# Patient Record
Sex: FEMALE | Race: WHITE | Employment: OTHER | URBAN - NONMETROPOLITAN AREA
[De-identification: names, ages, dates, MRNs, and addresses within clinical notes are randomized per-mention and may not be internally consistent; named-entity substitution may affect disease eponyms.]

---

## 2017-07-24 ENCOUNTER — HOSPITAL ENCOUNTER (OUTPATIENT)
Dept: LAB | Age: 67
Discharge: HOME | End: 2017-07-24
Attending: INTERNAL MEDICINE | Admitting: INTERNAL MEDICINE

## 2017-07-24 LAB
ALBUMIN SERPL-MCNC: 4.3 GM/DL (ref 3.4–5)
ALP BLD-CCNC: 63 IU/L (ref 40–129)
ALT SERPL-CCNC: 10 U/L (ref 10–40)
ANION GAP SERPL CALCULATED.3IONS-SCNC: 10 MMOL/L (ref 4–16)
AST SERPL-CCNC: 13 IU/L (ref 15–37)
BASOPHILS ABSOLUTE: 0.1 K/CU MM
BASOPHILS RELATIVE PERCENT: 0.7 % (ref 0–1)
BILIRUB SERPL-MCNC: 0.5 MG/DL (ref 0–1)
BUN BLDV-MCNC: 8 MG/DL (ref 6–23)
CALCIUM SERPL-MCNC: 9.7 MG/DL (ref 8.3–10.6)
CHLORIDE BLD-SCNC: 98 MMOL/L (ref 99–110)
CO2: 31 MMOL/L (ref 21–32)
CREAT SERPL-MCNC: 0.9 MG/DL (ref 0.6–1.1)
DIFFERENTIAL TYPE: ABNORMAL
EOSINOPHILS ABSOLUTE: 0.1 K/CU MM
EOSINOPHILS RELATIVE PERCENT: 0.8 % (ref 0–3)
GFR AFRICAN AMERICAN: >60 ML/MIN/1.73M2
GFR NON-AFRICAN AMERICAN: >60 ML/MIN/1.73M2
GLUCOSE BLD-MCNC: 82 MG/DL (ref 70–140)
HCT VFR BLD CALC: 46.6 % (ref 37–47)
HEMOGLOBIN: 14.9 GM/DL (ref 12.5–16)
IMMATURE NEUTROPHIL %: 0.4 % (ref 0–0.43)
LYMPHOCYTES ABSOLUTE: 1.8 K/CU MM
LYMPHOCYTES RELATIVE PERCENT: 24.3 % (ref 24–44)
MCH RBC QN AUTO: 29.1 PG (ref 27–31)
MCHC RBC AUTO-ENTMCNC: 32 % (ref 32–36)
MCV RBC AUTO: 91 FL (ref 78–100)
MONOCYTES ABSOLUTE: 0.5 K/CU MM
MONOCYTES RELATIVE PERCENT: 7.1 % (ref 0–4)
PDW BLD-RTO: 13 % (ref 11.7–14.9)
PLATELET # BLD: 167 K/CU MM (ref 140–440)
PMV BLD AUTO: 12.1 FL (ref 7.5–11.1)
POTASSIUM SERPL-SCNC: 4.1 MMOL/L (ref 3.5–5.1)
RBC # BLD: 5.12 M/CU MM (ref 4.2–5.4)
SEGMENTED NEUTROPHILS ABSOLUTE COUNT: 5 K/CU MM
SEGMENTED NEUTROPHILS RELATIVE PERCENT: 66.7 % (ref 36–66)
SODIUM BLD-SCNC: 139 MMOL/L (ref 135–145)
TOTAL IMMATURE NEUTOROPHIL: 0.03 K/CU MM
TOTAL PROTEIN: 7 GM/DL (ref 6.4–8.2)
WBC # BLD: 7.5 K/CU MM (ref 4–10.5)

## 2017-07-25 LAB
CLOSTRIDIUM DIFFICILE, PCR: NORMAL
ERYTHROCYTE SEDIMENTATION RATE: 5 MM/HR (ref 0–30)
HIGH SENSITIVE C-REACTIVE PROTEIN: 1.7 MG/L

## 2019-02-12 ENCOUNTER — HOSPITAL ENCOUNTER (OUTPATIENT)
Dept: ULTRASOUND IMAGING | Age: 69
Discharge: HOME | End: 2019-02-12
Payer: MEDICARE

## 2019-02-12 ENCOUNTER — HOSPITAL ENCOUNTER (OUTPATIENT)
Dept: WOMENS IMAGING | Age: 69
Discharge: HOME | End: 2019-02-12
Payer: MEDICARE

## 2019-02-12 DIAGNOSIS — N64.4 BREAST PAIN, LEFT: ICD-10-CM

## 2019-02-12 DIAGNOSIS — R92.8 ABNORMAL MAMMOGRAM: ICD-10-CM

## 2019-02-12 DIAGNOSIS — N63.24 UNSPECIFIED LUMP IN THE LEFT BREAST, LOWER INNER QUADRANT: ICD-10-CM

## 2019-02-12 PROCEDURE — 76642 ULTRASOUND BREAST LIMITED: CPT

## 2019-02-12 PROCEDURE — 77066 DX MAMMO INCL CAD BI: CPT

## 2019-05-07 ENCOUNTER — HOSPITAL ENCOUNTER (OUTPATIENT)
Dept: GENERAL RADIOLOGY | Age: 69
Discharge: HOME | End: 2019-05-07
Payer: MEDICARE

## 2019-05-07 DIAGNOSIS — R10.9 STOMACH ACHE: ICD-10-CM

## 2019-05-07 PROCEDURE — 74250 X-RAY XM SM INT 1CNTRST STD: CPT

## 2019-11-04 ENCOUNTER — HOSPITAL ENCOUNTER (OUTPATIENT)
Dept: MRI IMAGING | Age: 69
Discharge: HOME | End: 2019-11-04
Payer: MEDICARE

## 2019-11-04 DIAGNOSIS — M54.50 LOW BACK PAIN, UNSPECIFIED BACK PAIN LATERALITY, UNSPECIFIED CHRONICITY, UNSPECIFIED WHETHER SCIATICA PRESENT: ICD-10-CM

## 2019-11-04 PROCEDURE — 72148 MRI LUMBAR SPINE W/O DYE: CPT

## 2023-05-10 ENCOUNTER — HOSPITAL ENCOUNTER (OUTPATIENT)
Dept: MAMMOGRAPHY | Age: 73
Discharge: HOME | End: 2023-05-10
Payer: MEDICARE

## 2023-05-10 DIAGNOSIS — Z12.31 SCREENING MAMMOGRAM, ENCOUNTER FOR: ICD-10-CM

## 2023-05-10 PROCEDURE — 77063 BREAST TOMOSYNTHESIS BI: CPT

## 2024-02-02 ENCOUNTER — HOSPITAL ENCOUNTER (OUTPATIENT)
Dept: CT IMAGING | Age: 74
Discharge: HOME | End: 2024-02-02
Attending: FAMILY MEDICINE
Payer: MEDICARE

## 2024-02-02 DIAGNOSIS — R10.12 LUQ ABDOMINAL PAIN: ICD-10-CM

## 2024-02-02 PROCEDURE — 74177 CT ABD & PELVIS W/CONTRAST: CPT

## 2024-02-02 PROCEDURE — 6360000004 HC RX CONTRAST MEDICATION: Performed by: FAMILY MEDICINE

## 2024-02-02 RX ADMIN — IOPAMIDOL 100 ML: 755 INJECTION, SOLUTION INTRAVENOUS at 15:35

## 2024-02-02 RX ADMIN — IOPAMIDOL 20 ML: 755 INJECTION, SOLUTION INTRAVENOUS at 14:10

## 2024-06-05 ENCOUNTER — APPOINTMENT (OUTPATIENT)
Dept: GENERAL RADIOLOGY | Age: 74
End: 2024-06-05
Attending: STUDENT IN AN ORGANIZED HEALTH CARE EDUCATION/TRAINING PROGRAM
Payer: MEDICARE

## 2024-06-05 ENCOUNTER — APPOINTMENT (OUTPATIENT)
Dept: GENERAL RADIOLOGY | Age: 74
End: 2024-06-05
Payer: MEDICARE

## 2024-06-05 ENCOUNTER — HOSPITAL ENCOUNTER (EMERGENCY)
Age: 74
Discharge: HOME | End: 2024-06-05
Attending: STUDENT IN AN ORGANIZED HEALTH CARE EDUCATION/TRAINING PROGRAM
Payer: MEDICARE

## 2024-06-05 VITALS
DIASTOLIC BLOOD PRESSURE: 72 MMHG | OXYGEN SATURATION: 95 % | RESPIRATION RATE: 18 BRPM | HEIGHT: 64 IN | BODY MASS INDEX: 46.1 KG/M2 | WEIGHT: 270 LBS | TEMPERATURE: 98.3 F | HEART RATE: 82 BPM | SYSTOLIC BLOOD PRESSURE: 118 MMHG

## 2024-06-05 DIAGNOSIS — M79.605 PAIN OF LEFT LOWER EXTREMITY: ICD-10-CM

## 2024-06-05 DIAGNOSIS — S82.832A CLOSED FRACTURE OF PROXIMAL END OF LEFT FIBULA, UNSPECIFIED FRACTURE MORPHOLOGY, INITIAL ENCOUNTER: ICD-10-CM
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
## 2024-06-05 PROCEDURE — 6360000002 HC RX W HCPCS: Performed by: EMERGENCY MEDICINE

## 2024-06-05 PROCEDURE — 96372 THER/PROPH/DIAG INJ SC/IM: CPT

## 2024-06-05 PROCEDURE — 73590 X-RAY EXAM OF LOWER LEG: CPT

## 2024-06-05 PROCEDURE — 29125 APPL SHORT ARM SPLINT STATIC: CPT

## 2024-06-05 PROCEDURE — 29515 APPLICATION SHORT LEG SPLINT: CPT

## 2024-06-05 PROCEDURE — 6360000002 HC RX W HCPCS: Performed by: STUDENT IN AN ORGANIZED HEALTH CARE EDUCATION/TRAINING PROGRAM

## 2024-06-05 PROCEDURE — 72170 X-RAY EXAM OF PELVIS: CPT

## 2024-06-05 PROCEDURE — 99284 EMERGENCY DEPT VISIT MOD MDM: CPT

## 2024-06-05 PROCEDURE — 96374 THER/PROPH/DIAG INJ IV PUSH: CPT

## 2024-06-05 RX ORDER — PANTOPRAZOLE SODIUM 40 MG/1
40 TABLET, DELAYED RELEASE ORAL DAILY
COMMUNITY
Start: 2024-05-16

## 2024-06-05 RX ORDER — OXYCODONE AND ACETAMINOPHEN 7.5; 325 MG/1; MG/1
1 TABLET ORAL EVERY 8 HOURS PRN
COMMUNITY
Start: 2024-06-05 | End: 2024-07-05

## 2024-06-05 RX ORDER — GABAPENTIN 100 MG/1
200 CAPSULE ORAL 3 TIMES DAILY
COMMUNITY
Start: 2024-06-11 | End: 2024-08-10

## 2024-06-05 RX ORDER — KETOROLAC TROMETHAMINE 30 MG/ML
30 INJECTION, SOLUTION INTRAMUSCULAR; INTRAVENOUS ONCE
Status: COMPLETED | OUTPATIENT
Start: 2024-06-05 | End: 2024-06-05

## 2024-06-05 RX ADMIN — KETOROLAC TROMETHAMINE 30 MG: 30 INJECTION INTRAMUSCULAR; INTRAVENOUS at 21:08

## 2024-06-05 RX ADMIN — HYDROMORPHONE HYDROCHLORIDE 0.5 MG: 1 INJECTION, SOLUTION INTRAMUSCULAR; INTRAVENOUS; SUBCUTANEOUS at 16:04

## 2024-06-05 RX ADMIN — HYDROMORPHONE HYDROCHLORIDE 0.5 MG: 1 INJECTION, SOLUTION INTRAMUSCULAR; INTRAVENOUS; SUBCUTANEOUS at 21:08

## 2024-06-05 ASSESSMENT — PAIN - FUNCTIONAL ASSESSMENT
PAIN_FUNCTIONAL_ASSESSMENT: PREVENTS OR INTERFERES WITH ALL ACTIVE AND SOME PASSIVE ACTIVITIES
PAIN_FUNCTIONAL_ASSESSMENT: 0-10

## 2024-06-05 ASSESSMENT — PAIN DESCRIPTION - ORIENTATION
ORIENTATION: LEFT

## 2024-06-05 ASSESSMENT — PAIN DESCRIPTION - LOCATION
LOCATION: LEG

## 2024-06-05 ASSESSMENT — PAIN SCALES - GENERAL
PAINLEVEL_OUTOF10: 2
PAINLEVEL_OUTOF10: 10
PAINLEVEL_OUTOF10: 9

## 2024-06-05 ASSESSMENT — PAIN DESCRIPTION - DESCRIPTORS
DESCRIPTORS: ACHING;DISCOMFORT
DESCRIPTORS: ACHING;DISCOMFORT
DESCRIPTORS: ACHING

## 2024-06-05 ASSESSMENT — LIFESTYLE VARIABLES
HOW MANY STANDARD DRINKS CONTAINING ALCOHOL DO YOU HAVE ON A TYPICAL DAY: PATIENT DOES NOT DRINK
HOW OFTEN DO YOU HAVE A DRINK CONTAINING ALCOHOL: NEVER

## 2024-06-05 NOTE — DISCHARGE INSTRUCTIONS
-Take Tylenol (1000 mg, every 6-8 hours) and/or ibuprofen (600 mg, every 6-8 hours) as needed for pain  -Follow-up with orthopedic surgery in 1 week  -, To emergency department if you develop severe pain, if they are unable to take care of you at home, if the splint is very tight, or if you are concerned

## 2024-06-05 NOTE — ED PROVIDER NOTES
Emergency Department Encounter    Patient: Darrell Sutton  MRN: [id number]  : 1950  Date of Evaluation: 2024  ED Provider:  Audrey Green MD    Briefly, Darrell Sutton is a 74 y.o. female presented to the emergency department with left lower leg pain.  She did fall several days ago.  She has has mobility issues at baseline.  She is seen by previous physician.  Please see that note for full HPI.    I have reviewed and interpreted all of the currently available lab results from this visit (if applicable)  No results found for this visit on 24.   Radiographs (if obtained):    [] Radiologist's Report Reviewed:  XR PELVIS (1-2 VIEWS)   Final Result   1. As above.      Electronically signed by Nessa Edmondson      XR TIBIA FIBULA LEFT (2 VIEWS)   Final Result      Mildly comminuted acute appearing fracture involving the proximal fibula      Electronically signed by Eric Perez          MDM:    71-year-old female presents with left lower leg pain.  She is seen by previous physician.  She did fall at home several days ago.  She has also mobility issues at baseline.  She endorses a history of lumbar stenosis.    Her x-ray of fibula does show a proximal fibular fracture.  This is discussed with patient.  She is placed in a splint.  She is able with help at home to take care of her needs at home.  She will follow-up outpatient.  She is given referral to orthopedics.  She is in pain management.  She was show asymptomatic the emergency department.  She will resume treatment with her home pain medications.    She is discharged, in stable condition, with strict return precautions    Clinical Impression:  1. Fall, initial encounter    2. Pain of left lower extremity    3. Closed fracture of proximal end of left fibula, unspecified fracture morphology, initial encounter      Disposition referral (if applicable):  Max Koo MD   [address]  [facility] [address]  [phone]    Schedule an 
[id number]  [phone]    Schedule an appointment as soon as possible for a visit   As needed    Jose Carlos Hackett MD  [address]  [facility] [address]  [phone]    Schedule an appointment as soon as possible for a visit       Disposition medications (if applicable):  New Prescriptions    No medications on file     ED Provider Disposition Time  DISPOSITION  - pending      Comment: Please note this report has been produced using speech recognition software and may contain errors related to that system including errors in grammar, punctuation, and spelling, as well as words and phrases that may be inappropriate.  Efforts were made to edit the dictations.        David Brown MD  06/05/24 8233

## 2024-06-11 ENCOUNTER — OFFICE VISIT (OUTPATIENT)
Dept: ORTHOPEDIC SURGERY | Age: 74
End: 2024-06-11
Payer: MEDICARE

## 2024-06-11 VITALS
WEIGHT: 270 LBS | RESPIRATION RATE: 14 BRPM | OXYGEN SATURATION: 95 % | HEIGHT: 64 IN | BODY MASS INDEX: 46.1 KG/M2 | HEART RATE: 85 BPM

## 2024-06-11 DIAGNOSIS — S82.832A OTHER CLOSED FRACTURE OF PROXIMAL END OF LEFT FIBULA, INITIAL ENCOUNTER: Primary | ICD-10-CM

## 2024-06-11 PROCEDURE — 1090F PRES/ABSN URINE INCON ASSESS: CPT

## 2024-06-11 PROCEDURE — 3017F COLORECTAL CA SCREEN DOC REV: CPT

## 2024-06-11 PROCEDURE — 99203 OFFICE O/P NEW LOW 30 MIN: CPT

## 2024-06-11 PROCEDURE — G8417 CALC BMI ABV UP PARAM F/U: HCPCS

## 2024-06-11 PROCEDURE — 4004F PT TOBACCO SCREEN RCVD TLK: CPT

## 2024-06-11 PROCEDURE — G8400 PT W/DXA NO RESULTS DOC: HCPCS

## 2024-06-11 PROCEDURE — G8427 DOCREV CUR MEDS BY ELIG CLIN: HCPCS

## 2024-06-11 PROCEDURE — 1123F ACP DISCUSS/DSCN MKR DOCD: CPT

## 2024-06-11 NOTE — PATIENT INSTRUCTIONS
Ace bandage used for the left knee  Home PT ordered.  Weight bear to tranfers  Only allowed to walk 5-10 feet max  Ice and elevate as needed.  Tylenol or Motrin for pain.  Follow up in 4 weeks.    We are committed to providing you the best care possible.  If you receive a survey after visiting one of our offices, please take time to share your experience concerning your physician office visit.  These surveys are confidential and no health information about you is shared.  We are eager to improve for you and we are counting on your feedback to help make that happen.

## 2024-06-12 ASSESSMENT — ENCOUNTER SYMPTOMS
FACIAL SWELLING: 0
COUGH: 0
NAUSEA: 0
RHINORRHEA: 0
BACK PAIN: 0
SHORTNESS OF BREATH: 0

## 2024-06-12 NOTE — PROGRESS NOTES
6/11/2024   Chief Complaint   Patient presents with    Fracture     Clsoed proximal end of left fibula DOI 2 weeks ago         History of Present Illness:                             Darrell Sutton is a 74 y.o. female presenting to the office today for lateral leg pain at the left proximal shin after suffering a fall roughly 2 weeks ago.  Patient states she has a previous baseline of only walking very short distances for transfers and to get to the bathroom.  Patient states that she fell and went to the emergency room only to be diagnosed with a proximal fibular fracture.  She states she has pain and bruising on the outside of her shin.  She notes the pain has improved slightly but if the area is pressed or bumped it is very tender.          Medical History  Patient's medications, allergies, past medical, surgical, social and family histories were reviewed and updated as appropriate.    Past Medical History:   Diagnosis Date    Anxiety     Cancer (HCC)     ductal carcioma left    History of therapeutic radiation     Hx antineoplastic chemo     Hypertension     IBS (irritable bowel syndrome)     Injury of breast     Osteoarthritis      Past Surgical History:   Procedure Laterality Date    BREAST LUMPECTOMY      BREAST SURGERY  01/01/2009    left lumpectomy    CHOLECYSTECTOMY, LAPAROSCOPIC  12/05/2014    Robotic assisted laproscopic cholesystectomy    EYE SURGERY Right 1029/2013    Cataract Extraction    TONSILLECTOMY      TUBAL LIGATION       Family History   Problem Relation Age of Onset    Diabetes Mother     Heart Disease Mother         angioplasty    Alzheimer's Disease Mother     Coronary Art Dis Father     Coronary Art Dis Sister     Diabetes Sister      Social History     Socioeconomic History    Marital status:      Spouse name: Alon    Number of children: 2    Years of education: None    Highest education level: None   Occupational History    Occupation: retired   Tobacco Use    Smoking

## 2024-06-24 ENCOUNTER — TELEPHONE (OUTPATIENT)
Dept: ORTHOPEDIC SURGERY | Age: 74
End: 2024-06-24

## 2024-06-24 NOTE — TELEPHONE ENCOUNTER
PT is asking if there are any ROM restrictions for Darrell Sutton she has FINDINGS: Anatomic alignment. Mildly comminuted acute appearing fracture involving the proximal fibula. Mild degenerative change involving the visualized  left knee. IMPRESSION:   Mildly comminuted acute appearing fracture involving the proximal fibula   also she would like to know if she can take the ACE wrap off at night

## 2024-07-09 ENCOUNTER — OFFICE VISIT (OUTPATIENT)
Dept: ORTHOPEDIC SURGERY | Age: 74
End: 2024-07-09
Payer: MEDICARE

## 2024-07-09 VITALS — TEMPERATURE: 97.2 F | OXYGEN SATURATION: 97 % | HEART RATE: 78 BPM

## 2024-07-09 DIAGNOSIS — S82.832D OTHER CLOSED FRACTURE OF PROXIMAL END OF LEFT FIBULA WITH ROUTINE HEALING, SUBSEQUENT ENCOUNTER: Primary | ICD-10-CM

## 2024-07-09 PROCEDURE — 4004F PT TOBACCO SCREEN RCVD TLK: CPT

## 2024-07-09 PROCEDURE — 99213 OFFICE O/P EST LOW 20 MIN: CPT

## 2024-07-09 PROCEDURE — 1090F PRES/ABSN URINE INCON ASSESS: CPT

## 2024-07-09 PROCEDURE — G8417 CALC BMI ABV UP PARAM F/U: HCPCS

## 2024-07-09 PROCEDURE — 3017F COLORECTAL CA SCREEN DOC REV: CPT

## 2024-07-09 PROCEDURE — G8427 DOCREV CUR MEDS BY ELIG CLIN: HCPCS

## 2024-07-09 PROCEDURE — 1123F ACP DISCUSS/DSCN MKR DOCD: CPT

## 2024-07-09 PROCEDURE — G8400 PT W/DXA NO RESULTS DOC: HCPCS

## 2024-07-09 NOTE — PATIENT INSTRUCTIONS
Continue weight-bearing as tolerated.  Continue range of motion exercises as instructed.  Ice and elevate as needed.  Tylenol or Motrin for pain.  Follow up as needed.  If you are still having pains in 6 weeks please call office for another appointment.   Please schedule an appointment with your PCP for bilateral lower extremity edema    We are committed to providing you the best care possible.  If you receive a survey after visiting one of our offices, please take time to share your experience concerning your physician office visit.  These surveys are confidential and no health information about you is shared.  We are eager to improve for you and we are counting on your feedback to help make that happen.

## 2024-07-09 NOTE — PROGRESS NOTES
Patient seen in office today for:Closed fracture of proximal end of left fibula    Pt states no issues fx feels healed zero pain   She does have a nc she would like to speak to you about- left foot and ankle has been swelling a lot it has nothing to do with her fx many years this started would like your opinion  and a referral to someone else if you can .  Strength in her fibula is not coming back like before that is the only complaint she has with that left fibula fx        DOI:6/2/24      Patient reports 0/10 pain.  RICE and medication are effective to alleviate pain and reduce swelling.   Pain also alleviated by: no OTC medication  Pain worsened by: Patient reports painful ROM & weight bearing.     Patient does physical therapy twice a week and exercises also       Xrays performed in office today.

## 2024-07-23 ASSESSMENT — ENCOUNTER SYMPTOMS
RHINORRHEA: 0
SHORTNESS OF BREATH: 0
BACK PAIN: 0
FACIAL SWELLING: 0
COUGH: 0
NAUSEA: 0

## 2024-07-23 NOTE — PROGRESS NOTES
7/9/2024   Chief Complaint   Patient presents with    Knee Pain     Closed fracture of proximal end of left fibula          History of Present Illness:       Today's HPI:  Patient is a 74-year-old female returning to the office today for continued nonoperative management regarding a proximal fibula fracture.  Patient states she has had much improvement to her pain levels.  However, she does note some continued swelling when she is up on her feet more often.    Pt states no issues fx feels healed zero pain   She does have a nc she would like to speak to you about- left foot and ankle has been swelling a lot it has nothing to do with her fx many years this started would like your opinion  and a referral to someone else if you can .  Strength in her fibula is not coming back like before that is the only complaint she has with that left fibula fx          DOI:6/2/24        Patient reports 0/10 pain.  RICE and medication are effective to alleviate pain and reduce swelling.   Pain also alleviated by: no OTC medication  Pain worsened by: Patient reports painful ROM & weight bearing.      Patient does physical therapy twice a week and exercises also         Xrays performed in office today.   Previous HPI:                        Darrell Sutton is a 74 y.o. female presenting to the office today for lateral leg pain at the left proximal shin after suffering a fall roughly 2 weeks ago.  Patient states she has a previous baseline of only walking very short distances for transfers and to get to the bathroom.  Patient states that she fell and went to the emergency room only to be diagnosed with a proximal fibular fracture.  She states she has pain and bruising on the outside of her shin.  She notes the pain has improved slightly but if the area is pressed or bumped it is very tender.          Medical History  Patient's medications, allergies, past medical, surgical, social and family histories were reviewed and updated as

## 2024-08-05 ENCOUNTER — TELEPHONE (OUTPATIENT)
Dept: ORTHOPEDIC SURGERY | Age: 74
End: 2024-08-05

## 2024-08-05 NOTE — TELEPHONE ENCOUNTER
Juliann from [facility] ([phone]) called in stating that the patient is no longer wanting to do at home PT and she will continue doing her own home exercises.

## 2024-08-13 ENCOUNTER — OFFICE VISIT (OUTPATIENT)
Dept: ORTHOPEDIC SURGERY | Age: 74
End: 2024-08-13
Payer: MEDICARE

## 2024-08-13 VITALS — HEART RATE: 74 BPM | TEMPERATURE: 97.6 F | OXYGEN SATURATION: 96 %

## 2024-08-13 DIAGNOSIS — M19.012 ARTHRITIS OF LEFT SHOULDER REGION: Primary | ICD-10-CM

## 2024-08-13 PROCEDURE — 99213 OFFICE O/P EST LOW 20 MIN: CPT

## 2024-08-13 PROCEDURE — G8417 CALC BMI ABV UP PARAM F/U: HCPCS

## 2024-08-13 PROCEDURE — G8427 DOCREV CUR MEDS BY ELIG CLIN: HCPCS

## 2024-08-13 PROCEDURE — 1123F ACP DISCUSS/DSCN MKR DOCD: CPT

## 2024-08-13 PROCEDURE — G8400 PT W/DXA NO RESULTS DOC: HCPCS

## 2024-08-13 PROCEDURE — 4004F PT TOBACCO SCREEN RCVD TLK: CPT

## 2024-08-13 PROCEDURE — 1090F PRES/ABSN URINE INCON ASSESS: CPT

## 2024-08-13 PROCEDURE — 3017F COLORECTAL CA SCREEN DOC REV: CPT

## 2024-08-13 NOTE — PROGRESS NOTES
Patient seen in office today for: _left shoulder pain  Severe osteoarthritis       Date of last injection: no injections in should    Patient reports 10/10 pain.  RICE and medication are effective to alleviate pain and reduce swelling.   Pain also alleviated by: OTC meds as needed   Pain worsened by: Patient reports painful ROM & weight bearing.     Patient is interested in injection today    X rays performed in office today.

## 2024-08-15 ENCOUNTER — HOSPITAL ENCOUNTER (OUTPATIENT)
Dept: ULTRASOUND IMAGING | Age: 74
Discharge: HOME | End: 2024-08-15
Attending: FAMILY MEDICINE
Payer: MEDICARE

## 2024-08-15 DIAGNOSIS — M79.89 LEG SWELLING: ICD-10-CM

## 2024-08-15 PROCEDURE — 93971 EXTREMITY STUDY: CPT

## 2024-08-27 ASSESSMENT — ENCOUNTER SYMPTOMS
RHINORRHEA: 0
BACK PAIN: 0
FACIAL SWELLING: 0
NAUSEA: 0
SHORTNESS OF BREATH: 0
COUGH: 0

## 2024-08-27 NOTE — PROGRESS NOTES
8/13/2024   Chief Complaint   Patient presents with    Shoulder Pain     left shoulder pain             History of Present Illness:                             Darrell Sutton is a 74 y.o. female returning to the office today for left shoulder pain.  Patient states that she has not had injections in the left shoulder in the past but she has had increased shoulder pain over the last several months.  She states she has had treatment for other issues such as her knees in the past and cortisone was provided mixed results.  She is open to an injection in her shoulder today.  She states her pain is a 10 out of 10 and worse with overhead motions.  Patient denies any acute or traumatic injury that would easily explain her symptoms.    Patient seen in office today for: _left shoulder pain  Severe osteoarthritis         Date of last injection: no injections in should     Patient reports 10/10 pain.  RICE and medication are effective to alleviate pain and reduce swelling.   Pain also alleviated by: OTC meds as needed   Pain worsened by: Patient reports painful ROM & weight bearing.      Patient is interested in injection today     X rays performed in office today.        Medical History  Patient's medications, allergies, past medical, surgical, social and family histories were reviewed and updated as appropriate.      Review of Systems   Constitutional:  Negative for fever.   HENT:  Negative for facial swelling and rhinorrhea.    Respiratory:  Negative for cough and shortness of breath.    Cardiovascular:  Negative for chest pain.   Gastrointestinal:  Negative for nausea.   Musculoskeletal:  Positive for arthralgias. Negative for back pain, gait problem, joint swelling, myalgias, neck pain and neck stiffness.   Skin:  Negative for pallor and rash.   Neurological:  Negative for facial asymmetry and speech difficulty.   Psychiatric/Behavioral:  Negative for agitation and confusion.

## 2024-09-10 ENCOUNTER — PROCEDURE VISIT (OUTPATIENT)
Dept: ORTHOPEDIC SURGERY | Age: 74
End: 2024-09-10

## 2024-09-10 VITALS — OXYGEN SATURATION: 95 % | HEART RATE: 88 BPM | TEMPERATURE: 97.6 F

## 2024-09-10 DIAGNOSIS — M19.012 ARTHRITIS OF LEFT SHOULDER REGION: Primary | ICD-10-CM

## 2024-09-24 ENCOUNTER — OFFICE VISIT (OUTPATIENT)
Dept: ORTHOPEDIC SURGERY | Age: 74
End: 2024-09-24
Payer: MEDICARE

## 2024-09-24 VITALS
OXYGEN SATURATION: 97 % | WEIGHT: 270 LBS | HEIGHT: 64 IN | RESPIRATION RATE: 14 BRPM | HEART RATE: 75 BPM | BODY MASS INDEX: 46.1 KG/M2

## 2024-09-24 DIAGNOSIS — M19.011 ARTHRITIS OF RIGHT SHOULDER REGION: Primary | ICD-10-CM

## 2024-09-24 PROCEDURE — 1123F ACP DISCUSS/DSCN MKR DOCD: CPT

## 2024-09-24 PROCEDURE — G8400 PT W/DXA NO RESULTS DOC: HCPCS

## 2024-09-24 PROCEDURE — 1090F PRES/ABSN URINE INCON ASSESS: CPT

## 2024-09-24 PROCEDURE — 20610 DRAIN/INJ JOINT/BURSA W/O US: CPT

## 2024-09-24 PROCEDURE — 4004F PT TOBACCO SCREEN RCVD TLK: CPT

## 2024-09-24 PROCEDURE — 99213 OFFICE O/P EST LOW 20 MIN: CPT

## 2024-09-24 PROCEDURE — 3017F COLORECTAL CA SCREEN DOC REV: CPT

## 2024-09-24 PROCEDURE — G8417 CALC BMI ABV UP PARAM F/U: HCPCS

## 2024-09-24 PROCEDURE — G8427 DOCREV CUR MEDS BY ELIG CLIN: HCPCS

## 2024-09-24 RX ORDER — TRIAMCINOLONE ACETONIDE 40 MG/ML
80 INJECTION, SUSPENSION INTRA-ARTICULAR; INTRAMUSCULAR ONCE
Status: COMPLETED | OUTPATIENT
Start: 2024-09-24 | End: 2024-09-24

## 2024-09-24 RX ADMIN — TRIAMCINOLONE ACETONIDE 80 MG: 40 INJECTION, SUSPENSION INTRA-ARTICULAR; INTRAMUSCULAR at 15:16

## 2024-09-24 NOTE — PATIENT INSTRUCTIONS
Continue weight-bearing as tolerated.  Continue range of motion exercises as instructed.  Ice and elevate as needed.  Tylenol or Motrin for pain.  Follow up in 10 weeks for left shoulder injection   Apply Voltaren gel as directed    Right shoulder injection given today (4/2)  We are committed to providing you the best care possible.   If you receive a survey after visiting one of our offices, please take time to share your experience concerning your physician office visit.  These surveys are confidential and no health information about you is shared.     We are eager to improve for you and we are counting on your feedback to help make that happen.

## 2024-09-27 ASSESSMENT — ENCOUNTER SYMPTOMS
NAUSEA: 0
FACIAL SWELLING: 0
BACK PAIN: 0
RHINORRHEA: 0
COUGH: 0
SHORTNESS OF BREATH: 0

## 2024-09-30 ASSESSMENT — ENCOUNTER SYMPTOMS
NAUSEA: 0
BACK PAIN: 0
RHINORRHEA: 0
SHORTNESS OF BREATH: 0
FACIAL SWELLING: 0
COUGH: 0

## 2024-09-30 NOTE — PROGRESS NOTES
9/24/2024   Chief Complaint   Patient presents with    Shoulder Pain     Right        History of Present Illness:       Todays HPI:  Patient is a 74-year-old female returning to the office today for continued management of right shoulder pain.  She received a left shoulder injection at her previous visit.  She states her pain levels worsen with overhead motions and motions with an outstretched arm.  She states the cortisone injection she received on the left shoulder a couple weeks ago did not provide her with a significant amount of relief at this time.    Previous HPI:  Patient is a 74-year-old female returning to the office today for continued management of left shoulder pain.  She was seen roughly 2 weeks ago and it was outside of the window where she could reasonably receive a follow-up cortisone injection.  She is here for a cortisone injection based on her symptoms at her previous exam.    Patient seen in office today for: _ needs Left shoulder injection  No falls or injuries   Right shoulder is also starting to hurt but her left is  Patient reports 10/10 pain.  RICE and medication are effective to alleviate pain and reduce swelling.   Pain also alleviated by: OTC meds as needed  Pain worsened by: Patient reports painful ROM & weight bearing.         Patient is interested in injection today     Previous HPI:  Darrell Sutton is a 74 y.o. female returning to the office today for left shoulder pain.  Patient states that she has not had injections in the left shoulder in the past but she has had increased shoulder pain over the last several months.  She states she has had treatment for other issues such as her knees in the past and cortisone was provided mixed results.  She is open to an injection in her shoulder today.  She states her pain is a 10 out of 10 and worse with overhead motions.  Patient denies any acute or traumatic injury that would easily explain her symptoms.    Patient seen in office

## 2024-12-06 ENCOUNTER — OFFICE VISIT (OUTPATIENT)
Dept: ORTHOPEDIC SURGERY | Age: 74
End: 2024-12-06

## 2024-12-06 VITALS — HEIGHT: 64 IN | BODY MASS INDEX: 46.35 KG/M2 | HEART RATE: 107 BPM | OXYGEN SATURATION: 93 %

## 2024-12-06 DIAGNOSIS — M19.012 ARTHRITIS OF LEFT SHOULDER REGION: Primary | ICD-10-CM

## 2024-12-06 RX ORDER — TRIAMCINOLONE ACETONIDE 40 MG/ML
80 INJECTION, SUSPENSION INTRA-ARTICULAR; INTRAMUSCULAR ONCE
Status: COMPLETED | OUTPATIENT
Start: 2024-12-06 | End: 2024-12-06

## 2024-12-06 RX ADMIN — TRIAMCINOLONE ACETONIDE 80 MG: 40 INJECTION, SUSPENSION INTRA-ARTICULAR; INTRAMUSCULAR at 11:07

## 2024-12-12 ASSESSMENT — ENCOUNTER SYMPTOMS
SHORTNESS OF BREATH: 0
RHINORRHEA: 0
COUGH: 0
NAUSEA: 0
FACIAL SWELLING: 0
BACK PAIN: 0

## 2024-12-12 NOTE — PROGRESS NOTES
12/6/2024   Chief Complaint   Patient presents with    Shoulder Pain     Left shoulder pain        History of Present Illness:       Todays HPI:  Patient is a 74-year-old female returning to the office today for continued management of left shoulder pain.  Patient has received shoulder injections in the past and states that the injections have provided her a great amount of relief.  She states the last several weeks has produced an increase in her pain especially on the left side and she is open to a new injection today.      Previous HPI:  Patient is a 74-year-old female returning to the office today for continued management of right shoulder pain.  She received a left shoulder injection at her previous visit.  She states her pain levels worsen with overhead motions and motions with an outstretched arm.  She states the cortisone injection she received on the left shoulder a couple weeks ago did not provide her with a significant amount of relief at this time.      Medical History  Patient's medications, allergies, past medical, surgical, social and family histories were reviewed and updated as appropriate.      Review of Systems   Constitutional:  Negative for fever.   HENT:  Negative for facial swelling and rhinorrhea.    Respiratory:  Negative for cough and shortness of breath.    Cardiovascular:  Negative for chest pain.   Gastrointestinal:  Negative for nausea.   Musculoskeletal:  Positive for arthralgias. Negative for back pain, gait problem, joint swelling, myalgias, neck pain and neck stiffness.   Skin:  Negative for pallor and rash.   Neurological:  Negative for facial asymmetry and speech difficulty.   Psychiatric/Behavioral:  Negative for agitation and confusion.                                                Examination:  General Exam:  Vitals: Pulse (!) 107   Ht 1.626 m (5' 4\")   SpO2 93%   BMI 46.35 kg/m²    Physical Exam  Constitutional:       Appearance: Normal appearance.   HENT:      Head:

## 2025-03-02 ENCOUNTER — APPOINTMENT (OUTPATIENT)
Dept: GENERAL RADIOLOGY | Age: 75
End: 2025-03-02
Payer: MEDICARE

## 2025-03-02 ENCOUNTER — HOSPITAL ENCOUNTER (EMERGENCY)
Age: 75
Discharge: HOME | End: 2025-03-02
Attending: EMERGENCY MEDICINE
Payer: MEDICARE

## 2025-03-02 VITALS
OXYGEN SATURATION: 91 % | WEIGHT: 280 LBS | RESPIRATION RATE: 21 BRPM | TEMPERATURE: 98.2 F | HEIGHT: 64 IN | HEART RATE: 100 BPM | SYSTOLIC BLOOD PRESSURE: 127 MMHG | BODY MASS INDEX: 47.8 KG/M2 | DIASTOLIC BLOOD PRESSURE: 63 MMHG

## 2025-03-02 DIAGNOSIS — S80.01XA CONTUSION OF RIGHT KNEE, INITIAL ENCOUNTER: ICD-10-CM
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
## 2025-03-02 PROCEDURE — 99283 EMERGENCY DEPT VISIT LOW MDM: CPT

## 2025-03-02 PROCEDURE — 73590 X-RAY EXAM OF LOWER LEG: CPT

## 2025-03-02 PROCEDURE — 6370000000 HC RX 637 (ALT 250 FOR IP): Performed by: EMERGENCY MEDICINE

## 2025-03-02 PROCEDURE — 73562 X-RAY EXAM OF KNEE 3: CPT

## 2025-03-02 RX ORDER — CITALOPRAM HYDROBROMIDE 40 MG/1
40 TABLET ORAL DAILY
Status: ON HOLD | COMMUNITY
Start: 2024-12-20

## 2025-03-02 RX ORDER — HYDROMORPHONE HYDROCHLORIDE 4 MG/1
4 TABLET ORAL 3 TIMES DAILY PRN
COMMUNITY
Start: 2025-02-24 | End: 2025-03-03

## 2025-03-02 RX ORDER — HYDROCODONE BITARTRATE AND ACETAMINOPHEN 5; 325 MG/1; MG/1
2 TABLET ORAL ONCE
Status: COMPLETED | OUTPATIENT
Start: 2025-03-02 | End: 2025-03-02

## 2025-03-02 RX ORDER — GABAPENTIN 100 MG/1
200 CAPSULE ORAL 3 TIMES DAILY
Status: ON HOLD | COMMUNITY
Start: 2025-01-28 | End: 2025-04-28

## 2025-03-02 RX ADMIN — HYDROCODONE BITARTRATE AND ACETAMINOPHEN 2 TABLET: 5; 325 TABLET ORAL at 06:42

## 2025-03-02 RX ADMIN — TIZANIDINE 4 MG: 4 TABLET ORAL at 06:43

## 2025-03-02 ASSESSMENT — PAIN DESCRIPTION - LOCATION
LOCATION: LEG
LOCATION: GENERALIZED

## 2025-03-02 ASSESSMENT — PAIN SCALES - GENERAL
PAINLEVEL_OUTOF10: 2
PAINLEVEL_OUTOF10: 9

## 2025-03-02 ASSESSMENT — PAIN - FUNCTIONAL ASSESSMENT: PAIN_FUNCTIONAL_ASSESSMENT: 0-10

## 2025-03-02 ASSESSMENT — PAIN DESCRIPTION - ORIENTATION
ORIENTATION: RIGHT
ORIENTATION: RIGHT

## 2025-03-02 ASSESSMENT — PAIN DESCRIPTION - DESCRIPTORS
DESCRIPTORS: ACHING
DESCRIPTORS: SORE

## 2025-03-02 NOTE — ED PROVIDER NOTES
The history is provided by the patient.   Fall  The accident occurred Less than 1 hour ago. The fall occurred while walking (Patient was walking to bathroom when she slipped and fell onto the right knee and leg. The leg bent under her (flexion at the knee)). She fell from a height of 1 to 2 ft. She landed on A hard floor. The point of impact was the right knee (right leg). The pain is at a severity of 9/10. The pain is moderate. She was Not ambulatory at the scene. There was No entrapment after the fall. There was No drug use involved in the accident. There was No alcohol use involved in the accident. Pertinent negatives include no visual change, no fever, no numbness, no abdominal pain, no bowel incontinence, no nausea, no vomiting, no hematuria, no headaches, no hearing loss, no loss of consciousness and no tingling. Associated symptoms comments: The patient has a long established history of chronic pain and chronic pain issues. She gets injections in her shoulder for chronic pain and her back.       Review of Systems   Constitutional: Negative.  Negative for fever.   HENT: Negative.     Eyes: Negative.    Respiratory: Negative.     Cardiovascular: Negative.    Gastrointestinal: Negative.  Negative for abdominal pain, bowel incontinence, nausea and vomiting.   Genitourinary: Negative.  Negative for hematuria.   Musculoskeletal: Negative.    Skin: Negative.    Neurological: Negative.  Negative for tingling, loss of consciousness, numbness and headaches.   All other systems reviewed and are negative.      Family History   Problem Relation Age of Onset    Diabetes Mother     Heart Disease Mother         angioplasty    Alzheimer's Disease Mother     Coronary Art Dis Father     Coronary Art Dis Sister     Diabetes Sister      Social History     Socioeconomic History    Marital status:      Spouse name: Alon    Number of children: 2    Years of education: Not on file    Highest education level: Not on file 
clarification.        Karrie Agosto MD  03/02/25 0715

## 2025-03-02 NOTE — ED NOTES
Discharge instructions reviewed with patient. Reviewed medications with patient. No additional questions asked.  Voiced understanding. Encouraged patient to follow up as discussed by the ED physician. Reviewed how to access [employer] at discharged with patient. Encourage to sign up either on their smartphone or on the computer to be able to review the information form today's and future visits. Voiced understanding. No additional questions asked. Patient to car per w/c. States uses a walker at home to help her get around.  Physician aware.

## 2025-03-07 ENCOUNTER — PARAMEDICINE (OUTPATIENT)
Dept: OTHER | Age: 75
End: 2025-03-07

## 2025-03-07 ENCOUNTER — HOSPITAL ENCOUNTER (INPATIENT)
Age: 75
LOS: 4 days | Discharge: NURSING, REHAB OR LONG-TERM CARE | DRG: 189 | End: 2025-03-11
Attending: EMERGENCY MEDICINE | Admitting: STUDENT IN AN ORGANIZED HEALTH CARE EDUCATION/TRAINING PROGRAM
Payer: MEDICARE

## 2025-03-07 ENCOUNTER — APPOINTMENT (OUTPATIENT)
Dept: GENERAL RADIOLOGY | Age: 75
DRG: 189 | End: 2025-03-07
Payer: MEDICARE

## 2025-03-07 ENCOUNTER — APPOINTMENT (OUTPATIENT)
Dept: CT IMAGING | Age: 75
DRG: 189 | End: 2025-03-07
Payer: MEDICARE

## 2025-03-07 DIAGNOSIS — I50.9 CONGESTIVE HEART FAILURE, UNSPECIFIED HF CHRONICITY, UNSPECIFIED HEART FAILURE TYPE (HCC): ICD-10-CM

## 2025-03-07 DIAGNOSIS — L03.311 ABDOMINAL WALL CELLULITIS: ICD-10-CM

## 2025-03-07 DIAGNOSIS — F41.9 ANXIETY: Primary | ICD-10-CM

## 2025-03-07 DIAGNOSIS — J96.01 ACUTE RESPIRATORY FAILURE WITH HYPOXIA: ICD-10-CM

## 2025-03-07 DIAGNOSIS — L30.4 INTERTRIGO: ICD-10-CM

## 2025-03-07 DIAGNOSIS — E87.70 HYPERVOLEMIA, UNSPECIFIED HYPERVOLEMIA TYPE: ICD-10-CM

## 2025-03-07 DIAGNOSIS — R29.6 RECURRENT FALLS: ICD-10-CM

## 2025-03-07 PROBLEM — L03.90 CELLULITIS: Status: ACTIVE | Noted: 2025-03-07

## 2025-03-07 LAB
ALBUMIN SERPL-MCNC: 2.9 G/DL (ref 3.4–5)
ALBUMIN/GLOB SERPL: 0.9 {RATIO} (ref 1.1–2.2)
ALP SERPL-CCNC: 110 U/L (ref 40–129)
ALT SERPL-CCNC: 11 U/L (ref 10–40)
ANION GAP SERPL CALCULATED.3IONS-SCNC: 9 MMOL/L (ref 4–16)
AST SERPL-CCNC: 15 U/L (ref 15–37)
BASOPHILS # BLD: 0.04 K/UL
BASOPHILS NFR BLD: 0 % (ref 0–1)
BILIRUB SERPL-MCNC: 0.4 MG/DL (ref 0–1)
BNP SERPL-MCNC: 864 PG/ML (ref 0–449)
BUN SERPL-MCNC: 19 MG/DL (ref 6–23)
CALCIUM SERPL-MCNC: 7.7 MG/DL (ref 8.3–10.6)
CHLORIDE SERPL-SCNC: 101 MMOL/L (ref 99–110)
CK SERPL-CCNC: 91 U/L (ref 26–140)
CO2 SERPL-SCNC: 31 MMOL/L (ref 21–32)
CREAT SERPL-MCNC: 0.7 MG/DL (ref 0.6–1.1)
EKG ATRIAL RATE: 103 BPM
EKG DIAGNOSIS: NORMAL
EKG P AXIS: 15 DEGREES
EKG P-R INTERVAL: 114 MS
EKG Q-T INTERVAL: 340 MS
EKG QRS DURATION: 80 MS
EKG QTC CALCULATION (BAZETT): 445 MS
EKG R AXIS: -18 DEGREES
EKG T AXIS: 27 DEGREES
EKG VENTRICULAR RATE: 103 BPM
EOSINOPHIL # BLD: 0.11 K/UL
EOSINOPHILS RELATIVE PERCENT: 1 % (ref 0–3)
ERYTHROCYTE [DISTWIDTH] IN BLOOD BY AUTOMATED COUNT: 15.7 % (ref 11.7–14.9)
GFR, ESTIMATED: >90 ML/MIN/1.73M2
GLUCOSE SERPL-MCNC: 127 MG/DL (ref 74–99)
HCO3 VENOUS: 34.8 MMOL/L (ref 22–29)
HCT VFR BLD AUTO: 38.9 % (ref 37–47)
HGB BLD-MCNC: 11.7 G/DL (ref 12.5–16)
IMM GRANULOCYTES # BLD AUTO: 0.17 K/UL
IMM GRANULOCYTES NFR BLD: 1 %
LACTATE BLDV-SCNC: 0.8 MMOL/L (ref 0.4–2)
LYMPHOCYTES NFR BLD: 1.12 K/UL
LYMPHOCYTES RELATIVE PERCENT: 7 % (ref 24–44)
MAGNESIUM SERPL-MCNC: 1.9 MG/DL (ref 1.8–2.4)
MCH RBC QN AUTO: 28.1 PG (ref 27–31)
MCHC RBC AUTO-ENTMCNC: 30.1 G/DL (ref 32–36)
MCV RBC AUTO: 93.5 FL (ref 78–100)
MONOCYTES NFR BLD: 1.17 K/UL
MONOCYTES NFR BLD: 8 % (ref 0–4)
NEUTROPHILS NFR BLD: 83 % (ref 36–66)
NEUTS SEG NFR BLD: 12.45 K/UL
O2 SAT, VEN: 64.5 % (ref 34–95)
PCO2 VENOUS: 59.1 MM HG (ref 41–51)
PH VENOUS: 7.38 (ref 7.32–7.43)
PLATELET # BLD AUTO: 314 K/UL (ref 140–440)
PMV BLD AUTO: 9.9 FL (ref 7.5–11.1)
PO2 VENOUS: 35.3 MM HG (ref 28–48)
POSITIVE BASE EXCESS, VEN: 7.5 MMOL/L (ref 0–3)
POTASSIUM SERPL-SCNC: 4.4 MMOL/L (ref 3.5–5.1)
PROCALCITONIN SERPL-MCNC: 0.22 NG/ML
PROT SERPL-MCNC: 6 G/DL (ref 6.4–8.2)
RBC # BLD AUTO: 4.16 M/UL (ref 4.2–5.4)
SODIUM SERPL-SCNC: 141 MMOL/L (ref 135–145)
TCO2 CALC VENOUS: 37 MMOL/L (ref 21–32)
TROPONIN I SERPL HS-MCNC: 22 NG/L (ref 0–13)
TROPONIN I SERPL HS-MCNC: 22 NG/L (ref 0–13)
WBC OTHER # BLD: 15.1 K/UL (ref 4–10.5)

## 2025-03-07 PROCEDURE — 93005 ELECTROCARDIOGRAM TRACING: CPT | Performed by: EMERGENCY MEDICINE

## 2025-03-07 PROCEDURE — 2580000003 HC RX 258: Performed by: EMERGENCY MEDICINE

## 2025-03-07 PROCEDURE — 6370000000 HC RX 637 (ALT 250 FOR IP): Performed by: NURSE PRACTITIONER

## 2025-03-07 PROCEDURE — 70450 CT HEAD/BRAIN W/O DYE: CPT

## 2025-03-07 PROCEDURE — 6360000002 HC RX W HCPCS: Performed by: EMERGENCY MEDICINE

## 2025-03-07 PROCEDURE — 97162 PT EVAL MOD COMPLEX 30 MIN: CPT

## 2025-03-07 PROCEDURE — 72125 CT NECK SPINE W/O DYE: CPT

## 2025-03-07 PROCEDURE — 83880 ASSAY OF NATRIURETIC PEPTIDE: CPT

## 2025-03-07 PROCEDURE — 83605 ASSAY OF LACTIC ACID: CPT

## 2025-03-07 PROCEDURE — 85025 COMPLETE CBC W/AUTO DIFF WBC: CPT

## 2025-03-07 PROCEDURE — 6370000000 HC RX 637 (ALT 250 FOR IP): Performed by: STUDENT IN AN ORGANIZED HEALTH CARE EDUCATION/TRAINING PROGRAM

## 2025-03-07 PROCEDURE — 1200000000 HC SEMI PRIVATE

## 2025-03-07 PROCEDURE — 6360000002 HC RX W HCPCS: Performed by: STUDENT IN AN ORGANIZED HEALTH CARE EDUCATION/TRAINING PROGRAM

## 2025-03-07 PROCEDURE — 87040 BLOOD CULTURE FOR BACTERIA: CPT

## 2025-03-07 PROCEDURE — 84145 PROCALCITONIN (PCT): CPT

## 2025-03-07 PROCEDURE — 97530 THERAPEUTIC ACTIVITIES: CPT

## 2025-03-07 PROCEDURE — 82550 ASSAY OF CK (CPK): CPT

## 2025-03-07 PROCEDURE — 97166 OT EVAL MOD COMPLEX 45 MIN: CPT

## 2025-03-07 PROCEDURE — 96365 THER/PROPH/DIAG IV INF INIT: CPT

## 2025-03-07 PROCEDURE — 93010 ELECTROCARDIOGRAM REPORT: CPT | Performed by: INTERNAL MEDICINE

## 2025-03-07 PROCEDURE — 99285 EMERGENCY DEPT VISIT HI MDM: CPT

## 2025-03-07 PROCEDURE — 84484 ASSAY OF TROPONIN QUANT: CPT

## 2025-03-07 PROCEDURE — 80053 COMPREHEN METABOLIC PANEL: CPT

## 2025-03-07 PROCEDURE — 83735 ASSAY OF MAGNESIUM: CPT

## 2025-03-07 PROCEDURE — 2500000003 HC RX 250 WO HCPCS: Performed by: STUDENT IN AN ORGANIZED HEALTH CARE EDUCATION/TRAINING PROGRAM

## 2025-03-07 PROCEDURE — 71045 X-RAY EXAM CHEST 1 VIEW: CPT

## 2025-03-07 PROCEDURE — 82803 BLOOD GASES ANY COMBINATION: CPT

## 2025-03-07 RX ORDER — OXYCODONE AND ACETAMINOPHEN 5; 325 MG/1; MG/1
1 TABLET ORAL EVERY 4 HOURS PRN
Status: DISCONTINUED | OUTPATIENT
Start: 2025-03-07 | End: 2025-03-11 | Stop reason: HOSPADM

## 2025-03-07 RX ORDER — ONDANSETRON 4 MG/1
4 TABLET, ORALLY DISINTEGRATING ORAL EVERY 8 HOURS PRN
Status: DISCONTINUED | OUTPATIENT
Start: 2025-03-07 | End: 2025-03-11 | Stop reason: HOSPADM

## 2025-03-07 RX ORDER — ACETAMINOPHEN 325 MG/1
650 TABLET ORAL EVERY 6 HOURS PRN
Status: DISCONTINUED | OUTPATIENT
Start: 2025-03-07 | End: 2025-03-11 | Stop reason: HOSPADM

## 2025-03-07 RX ORDER — ENOXAPARIN SODIUM 100 MG/ML
30 INJECTION SUBCUTANEOUS 2 TIMES DAILY
Status: DISCONTINUED | OUTPATIENT
Start: 2025-03-07 | End: 2025-03-11 | Stop reason: HOSPADM

## 2025-03-07 RX ORDER — GABAPENTIN 100 MG/1
200 CAPSULE ORAL 3 TIMES DAILY
Status: DISCONTINUED | OUTPATIENT
Start: 2025-03-07 | End: 2025-03-11 | Stop reason: HOSPADM

## 2025-03-07 RX ORDER — OXYCODONE HYDROCHLORIDE 5 MG/1
5 TABLET ORAL ONCE
Status: COMPLETED | OUTPATIENT
Start: 2025-03-07 | End: 2025-03-07

## 2025-03-07 RX ORDER — SODIUM CHLORIDE 0.9 % (FLUSH) 0.9 %
5-40 SYRINGE (ML) INJECTION PRN
Status: DISCONTINUED | OUTPATIENT
Start: 2025-03-07 | End: 2025-03-11 | Stop reason: HOSPADM

## 2025-03-07 RX ORDER — SODIUM CHLORIDE 0.9 % (FLUSH) 0.9 %
5-40 SYRINGE (ML) INJECTION EVERY 12 HOURS SCHEDULED
Status: DISCONTINUED | OUTPATIENT
Start: 2025-03-07 | End: 2025-03-11 | Stop reason: HOSPADM

## 2025-03-07 RX ORDER — ACETAMINOPHEN 650 MG/1
650 SUPPOSITORY RECTAL EVERY 6 HOURS PRN
Status: DISCONTINUED | OUTPATIENT
Start: 2025-03-07 | End: 2025-03-11 | Stop reason: HOSPADM

## 2025-03-07 RX ORDER — SODIUM CHLORIDE 9 MG/ML
INJECTION, SOLUTION INTRAVENOUS PRN
Status: DISCONTINUED | OUTPATIENT
Start: 2025-03-07 | End: 2025-03-11 | Stop reason: HOSPADM

## 2025-03-07 RX ORDER — POTASSIUM CHLORIDE 1500 MG/1
40 TABLET, EXTENDED RELEASE ORAL PRN
Status: DISCONTINUED | OUTPATIENT
Start: 2025-03-07 | End: 2025-03-10

## 2025-03-07 RX ORDER — FUROSEMIDE 10 MG/ML
40 INJECTION INTRAMUSCULAR; INTRAVENOUS 2 TIMES DAILY
Status: DISPENSED | OUTPATIENT
Start: 2025-03-07 | End: 2025-03-08

## 2025-03-07 RX ORDER — MAGNESIUM SULFATE IN WATER 40 MG/ML
2000 INJECTION, SOLUTION INTRAVENOUS PRN
Status: DISCONTINUED | OUTPATIENT
Start: 2025-03-07 | End: 2025-03-11

## 2025-03-07 RX ORDER — ALPRAZOLAM 0.5 MG
1 TABLET ORAL 3 TIMES DAILY
Status: DISCONTINUED | OUTPATIENT
Start: 2025-03-07 | End: 2025-03-11 | Stop reason: HOSPADM

## 2025-03-07 RX ORDER — HYDROMORPHONE HYDROCHLORIDE 4 MG/1
4 TABLET ORAL 3 TIMES DAILY PRN
Status: ON HOLD | COMMUNITY
End: 2025-03-11 | Stop reason: HOSPADM

## 2025-03-07 RX ORDER — CITALOPRAM HYDROBROMIDE 20 MG/1
40 TABLET ORAL DAILY
Status: DISCONTINUED | OUTPATIENT
Start: 2025-03-07 | End: 2025-03-11 | Stop reason: HOSPADM

## 2025-03-07 RX ORDER — AMLODIPINE BESYLATE 10 MG/1
10 TABLET ORAL DAILY
Status: DISCONTINUED | OUTPATIENT
Start: 2025-03-07 | End: 2025-03-11 | Stop reason: HOSPADM

## 2025-03-07 RX ORDER — FUROSEMIDE 10 MG/ML
60 INJECTION INTRAMUSCULAR; INTRAVENOUS ONCE
Status: COMPLETED | OUTPATIENT
Start: 2025-03-07 | End: 2025-03-07

## 2025-03-07 RX ORDER — VANCOMYCIN 2 G/400ML
2000 INJECTION, SOLUTION INTRAVENOUS ONCE
Status: COMPLETED | OUTPATIENT
Start: 2025-03-07 | End: 2025-03-07

## 2025-03-07 RX ORDER — ONDANSETRON 2 MG/ML
4 INJECTION INTRAMUSCULAR; INTRAVENOUS EVERY 6 HOURS PRN
Status: DISCONTINUED | OUTPATIENT
Start: 2025-03-07 | End: 2025-03-11 | Stop reason: HOSPADM

## 2025-03-07 RX ORDER — POLYETHYLENE GLYCOL 3350 17 G/17G
17 POWDER, FOR SOLUTION ORAL DAILY PRN
Status: DISCONTINUED | OUTPATIENT
Start: 2025-03-07 | End: 2025-03-11 | Stop reason: HOSPADM

## 2025-03-07 RX ORDER — PANTOPRAZOLE SODIUM 40 MG/1
40 TABLET, DELAYED RELEASE ORAL DAILY
Status: DISCONTINUED | OUTPATIENT
Start: 2025-03-07 | End: 2025-03-11 | Stop reason: HOSPADM

## 2025-03-07 RX ORDER — POTASSIUM CHLORIDE 7.45 MG/ML
10 INJECTION INTRAVENOUS PRN
Status: DISCONTINUED | OUTPATIENT
Start: 2025-03-07 | End: 2025-03-10

## 2025-03-07 RX ADMIN — MICONAZOLE NITRATE: 2 POWDER TOPICAL at 10:30

## 2025-03-07 RX ADMIN — SODIUM CHLORIDE, PRESERVATIVE FREE 10 ML: 5 INJECTION INTRAVENOUS at 10:57

## 2025-03-07 RX ADMIN — PANTOPRAZOLE SODIUM 40 MG: 40 TABLET, DELAYED RELEASE ORAL at 10:51

## 2025-03-07 RX ADMIN — VANCOMYCIN 2000 MG: 2 INJECTION, SOLUTION INTRAVENOUS at 03:22

## 2025-03-07 RX ADMIN — OXYCODONE AND ACETAMINOPHEN 1 TABLET: 5; 325 TABLET ORAL at 18:39

## 2025-03-07 RX ADMIN — OXYCODONE 5 MG: 5 TABLET ORAL at 05:47

## 2025-03-07 RX ADMIN — ENOXAPARIN SODIUM 30 MG: 100 INJECTION SUBCUTANEOUS at 10:54

## 2025-03-07 RX ADMIN — MICONAZOLE NITRATE: 2 POWDER TOPICAL at 20:01

## 2025-03-07 RX ADMIN — ENOXAPARIN SODIUM 30 MG: 100 INJECTION SUBCUTANEOUS at 19:56

## 2025-03-07 RX ADMIN — CEFEPIME 2000 MG: 2 INJECTION, POWDER, FOR SOLUTION INTRAVENOUS at 02:42

## 2025-03-07 RX ADMIN — GABAPENTIN 200 MG: 100 CAPSULE ORAL at 15:58

## 2025-03-07 RX ADMIN — ALPRAZOLAM 1 MG: 0.5 TABLET ORAL at 19:56

## 2025-03-07 RX ADMIN — OXYCODONE AND ACETAMINOPHEN 1 TABLET: 5; 325 TABLET ORAL at 14:48

## 2025-03-07 RX ADMIN — GABAPENTIN 200 MG: 100 CAPSULE ORAL at 19:56

## 2025-03-07 RX ADMIN — ALPRAZOLAM 1 MG: 0.5 TABLET ORAL at 15:58

## 2025-03-07 RX ADMIN — ALPRAZOLAM 1 MG: 0.5 TABLET ORAL at 10:49

## 2025-03-07 RX ADMIN — FUROSEMIDE 60 MG: 10 INJECTION, SOLUTION INTRAMUSCULAR; INTRAVENOUS at 03:17

## 2025-03-07 RX ADMIN — GABAPENTIN 200 MG: 100 CAPSULE ORAL at 10:50

## 2025-03-07 RX ADMIN — SODIUM CHLORIDE, PRESERVATIVE FREE 10 ML: 5 INJECTION INTRAVENOUS at 19:56

## 2025-03-07 RX ADMIN — CITALOPRAM HYDROBROMIDE 40 MG: 20 TABLET ORAL at 10:50

## 2025-03-07 ASSESSMENT — PAIN DESCRIPTION - LOCATION
LOCATION: LEG;KNEE
LOCATION: BACK;LEG
LOCATION: BACK
LOCATION: BACK
LOCATION: BACK;LEG

## 2025-03-07 ASSESSMENT — PAIN DESCRIPTION - PAIN TYPE
TYPE: CHRONIC PAIN

## 2025-03-07 ASSESSMENT — PAIN SCALES - GENERAL
PAINLEVEL_OUTOF10: 8
PAINLEVEL_OUTOF10: 8
PAINLEVEL_OUTOF10: 4
PAINLEVEL_OUTOF10: 8
PAINLEVEL_OUTOF10: 9
PAINLEVEL_OUTOF10: 9
PAINLEVEL_OUTOF10: 8

## 2025-03-07 ASSESSMENT — PAIN DESCRIPTION - DESCRIPTORS
DESCRIPTORS: ACHING;BURNING
DESCRIPTORS: ACHING

## 2025-03-07 ASSESSMENT — PAIN DESCRIPTION - ORIENTATION
ORIENTATION: RIGHT
ORIENTATION: LOWER;MID
ORIENTATION: RIGHT;LOWER
ORIENTATION: LOWER;MID
ORIENTATION: RIGHT;LOWER

## 2025-03-07 ASSESSMENT — PAIN - FUNCTIONAL ASSESSMENT
PAIN_FUNCTIONAL_ASSESSMENT: ACTIVITIES ARE NOT PREVENTED
PAIN_FUNCTIONAL_ASSESSMENT: PREVENTS OR INTERFERES SOME ACTIVE ACTIVITIES AND ADLS
PAIN_FUNCTIONAL_ASSESSMENT: 0-10
PAIN_FUNCTIONAL_ASSESSMENT: ACTIVITIES ARE NOT PREVENTED
PAIN_FUNCTIONAL_ASSESSMENT: PREVENTS OR INTERFERES SOME ACTIVE ACTIVITIES AND ADLS

## 2025-03-07 ASSESSMENT — PAIN DESCRIPTION - ONSET
ONSET: ON-GOING

## 2025-03-07 ASSESSMENT — PAIN DESCRIPTION - FREQUENCY
FREQUENCY: CONTINUOUS

## 2025-03-07 NOTE — CARE COORDINATION
LISA spoke with Gisela at [facility] to initiate the referral, Gisela will review the patient's clinical documentation and follow-up with this CM regarding their determination.  Patient will need to complete a 3 night qualifying inpatient admission to be eligible for Medicare's SNF benefit.  The PT/OT evaluations are pending at this time.  CM will follow.     11:45 AM  CM spoke with Gisela at [facility] who advised that they are able to accept the patient if PT/OT recommends SNF placement.  The PT/OT evaluations remain pending at this time.  CM will follow.

## 2025-03-07 NOTE — PROGRESS NOTES
This RN has reviewed and agrees with JONAH Manjarrez LPN's data collection and has collaborated with this LPN regarding the patient's care plan.

## 2025-03-07 NOTE — PROGRESS NOTES
4 Eyes Skin Assessment     NAME:  Darrell Sutton  YOB: 1950  MEDICAL RECORD NUMBER:  [id number]    The patient is being assessed for  Admission    I agree that at least one RN has performed a thorough Head to Toe Skin Assessment on the patient. ALL assessment sites listed below have been assessed.      Areas assessed by both nurses:    Head, Face, Ears, Shoulders, Back, Chest, Arms, Elbows, Hands, Sacrum. Buttock, Coccyx, Ischium, Legs. Feet and Heels, and Under Medical Devices         Does the Patient have a Wound? Yes wound(s) were present on assessment. LDA wound assessment was Initiated and completed by RN Redness and excoriation to abdominal folds, groin,Under bilateral breasts, coccyx, behind right knee,rash to left forehead and scattered bruising       Gato Prevention initiated by RN: Yes  Wound Care Orders initiated by RN: Yes    Pressure Injury (Stage 3,4, Unstageable, DTI, NWPT, and Complex wounds) if present, place Wound referral order by RN under : No    New Ostomies, if present place, Ostomy referral order under : Yes     Nurse 1 eSignature: Electronically signed by Jen Hair RN on 3/7/25 at 5:30 AM EST    **SHARE this note so that the co-signing nurse can place an eSignature**    Nurse 2 eSignature: Electronically signed by Kusum Ball RN on 3/7/25 at 6:20 AM EST

## 2025-03-07 NOTE — CONSULTS
[facility] Nurse  Consult Note       Darrell Sutton  AGE: 75 y.o.   GENDER: female  : 1950  TODAY'S DATE:  3/7/2025    Subjective:     Reason for CWOCN Evaluation and Assessment: skin assessment      Darrell Sutton is a 75 y.o. female referred by:   [x] Physician  [] Nursing  [] Other:     Wound Identification:  Wound Type:  no open wounds but MASD-possible candidiasis  Contributing Factors: edema, chronic pressure, decreased mobility, obesity, smoking, decreased tissue oxygenation, incontinence of urine, and falls        PAST MEDICAL HISTORY        Diagnosis Date    Anxiety     Cancer (HCC)     ductal carcioma left    History of therapeutic radiation     Hx antineoplastic chemo     Hypertension     IBS (irritable bowel syndrome)     Injury of breast     Osteoarthritis        PAST SURGICAL HISTORY    Past Surgical History:   Procedure Laterality Date    BREAST LUMPECTOMY      BREAST SURGERY  2009    left lumpectomy    CHOLECYSTECTOMY, LAPAROSCOPIC  2014    Robotic assisted laproscopic cholesystectomy    EYE SURGERY Right     Cataract Extraction    TONSILLECTOMY      TUBAL LIGATION         FAMILY HISTORY    Family History   Problem Relation Age of Onset    Diabetes Mother     Heart Disease Mother         angioplasty    Alzheimer's Disease Mother     Coronary Art Dis Father     Coronary Art Dis Sister     Diabetes Sister        SOCIAL HISTORY    Social History     Tobacco Use    Smoking status: Every Day     Types: E-Cigarettes    Smokeless tobacco: Never   Substance Use Topics    Alcohol use: No    Drug use: No       ALLERGIES    No Known Allergies    MEDICATIONS    No current facility-administered medications on file prior to encounter.     Current Outpatient Medications on File Prior to Encounter   Medication Sig Dispense Refill    gabapentin (NEURONTIN) 100 MG capsule Take 2 capsules by mouth 3 times daily.      citalopram (CELEXA) 40 MG tablet Take 1 tablet by  Assessment:  Severity:  moderate  Quality of pain: sharp  Wound Pain Timing/Severity: with any movement  Premedicated: no    Plan:     Plan of Care:      Pt in bed. Agreeable to skin assessment. Was having trouble caring for self at home. Micotin powder and Drigo already in place to folds. Breast/abdomen/groins/right posterior knee with macerated and erythema. Appears to be MASD with possible candidiasis. Intergluteal fold with blanchable erythema and some MASD as well. Has Purwick. No open wounds noted. Heels intact. Recommend to continue Micotin and Drigo. Updated nurse. Atmos air pump applied to mattress. Pt is a mild risk for skin breakdown AEB Gato. Recommend repositioning with wedges and floating heels with pillows. Follow Gato orders.     Specialty Bed Required : yes  [] Low Air Loss   [x] Pressure Redistribution  [] Fluid Immersion  [] Bariatric  [] Total Pressure Relief  [] Other:     Discharge Plan:  Placement for patient upon discharge: tbd  Hospice Care: no  Patient appropriate for Outpatient Wound Care Center: no    Patient/Caregiver Teaching:  Level of patient/caregiver understanding able to:   Voiced understanding.        Electronically signed by Mounika Silvestre RN, CWOCN on 3/7/2025 at 1:37 PM

## 2025-03-07 NOTE — PROGRESS NOTES
Occupational Therapy  Facility/Department: [facility]  Occupational Therapy Initial Assessment    Name Darrell Sutton    1950   MRN [id number]   Date of Service 3/7/2025   Patient Room  010/010-01     Patient Diagnoses The primary encounter diagnosis was Acute respiratory failure with hypoxia (HCC). Diagnoses of Hypervolemia, unspecified hypervolemia type, Intertrigo, Abdominal wall cellulitis, and Recurrent falls were also pertinent to this visit.   Past Medical History  has a past medical history of Anxiety, Cancer (HCC), History of therapeutic radiation, Hx antineoplastic chemo, Hypertension, IBS (irritable bowel syndrome), Injury of breast, and Osteoarthritis.   Past Surgical History  has a past surgical history that includes Tonsillectomy; Tubal ligation; Breast surgery (2009); eye surgery (Right, ); Cholecystectomy, laparoscopic (2014); and Breast lumpectomy.       Discharge Recommendations  Skilled Nursing Facility  Equipment: Continue to assess    Assessment  Conditions Requiring Skilled Therapeutic Intervention: Decreased functional mobility, Decreased strength, Decreased endurance, Decreased ADL status, Decreased high level ADLs/IADLs, Increased pain and Decreased posture  Assessment of Evaluation: Patient is a 75 y.o. female who presents to [facility] with acute respiratory failure with hypoxia and multiple falls at home and has not been ambulatory. Patient presents below baseline functional level and would benefit from continued skilled occupational therapy services to address ADLs, functional transfers, and activity tolerance for general strengthening.   Treatment Diagnosis: Muscle weakness (generalized), Decreased endurance, and History of falls, at risk for falls  Therapy Prognosis: Good  Decision Making: Medium Complexity  Requires OT Follow-Up: Yes  Activity Tolerance: Patient limited by pain and Patient limited by endurance     Plan  General Plan: 2+

## 2025-03-07 NOTE — PROGRESS NOTES
Spoke with [facility] and verified pt takes Dilaudid 4mg PO 3 times a day. Contacted Emil to notify.

## 2025-03-07 NOTE — H&P
for: \"LABA1C\"  TSH: No results found for: \"TSH\"  Troponin: No results found for: \"TROPONINT\"  Lactic Acid: No results for input(s): \"LACTA\" in the last 72 hours.  BNP:   Recent Labs     03/07/25  0055   PROBNP 864*     UA:  Lab Results   Component Value Date/Time    NITRU NEGATIVE 12/04/2014 06:15 PM    COLORU ORANGE 12/04/2014 06:15 PM    PHUR 6.0 12/04/2014 06:15 PM    WBCUA 21 12/04/2014 06:15 PM    RBCUA NO CELLS SEEN 12/04/2014 06:15 PM    MUCUS MODERATE 12/04/2014 06:15 PM    BACTERIA RARE 12/04/2014 06:15 PM    CLARITYU HAZY 12/04/2014 06:15 PM    LEUKOCYTESUR MODERATE 12/04/2014 06:15 PM    UROBILINOGEN 2.0 12/04/2014 06:15 PM    BILIRUBINUR TRACE 12/04/2014 06:15 PM    BLOODU NEGATIVE 12/04/2014 06:15 PM    GLUCOSEU NEGATIVE 12/04/2014 06:15 PM    KETUA LARGE 12/04/2014 06:15 PM     Urine Cultures: No results found for: \"LABURIN\"  Blood Cultures: No results found for: \"BC\"  No results found for: \"BLOODCULT2\"  Organism: No results found for: \"ORG\"    Imaging/Diagnostics Last 24 Hours   CT CERVICAL SPINE WO CONTRAST    Result Date: 3/7/2025  PROCEDURE: CT CERVICAL SPINE WO CONTRAST DATE OF EXAM:  3/7/2025 1:29 DEMOGRAPHICS: 75 years old Female INDICATION: trauma COMPARISON: No existing relevant imaging study corresponding to the same anatomical region is available. TECHNIQUE: Contiguous axial slices of the cervical spine were submitted without IV administration of contrast. Additional coronal and sagittal reformatted images were submitted.    DOSE OPTIMIZATION: CT radiation dose optimization techniques (automated exposure  control, and use of iterative reconstruction techniques, or adjustment of the mA and/or kV according to patient size) were used to limit patient radiation dose. FINDINGS: CT CERVICAL SPINE: There is no acute displaced fracture or subluxation. Vertebral body height is maintained. There are mild multilevel degenerative changes in the cervical spine  with endplate sclerosis, osteophytosis and  Date: 3/2/2025  PROCEDURE: XR KNEE RIGHT (3 VIEWS) DATE OF EXAM:  3/2/2025 6:41 DEMOGRAPHICS: 75 years old Female INDICATION: trauma TECHNIQUE: XR KNEE RIGHT (3 VIEWS) COMPARISON: No existing relevant imaging study corresponding to the same anatomical region is available. FINDINGS: 3 views.  Tricompartmental osteophytes. Mild medial joint space narrowing. No acute fracture. Trace joint effusion. IMPRESSION:  1.  Mild osteoarthritis and trace joint effusion.  Dictated and Electronically Signed By: Mikhail Santiago MD 3/2/2025 6:51            Electronically signed by Lolis Shaikh MD on 3/7/2025 at 5:31 AM

## 2025-03-07 NOTE — CARE COORDINATION
03/07/25 0655   Service Assessment   Patient Orientation Alert and Oriented   Cognition Alert   History Provided By Patient   Primary Caregiver Spouse   Support Systems Spouse/Significant Other   Patient's Healthcare Decision Maker is: Patient Declined (Legal Next of Kin Remains as Decision Maker)   PCP Verified by CM Yes   Prior Functional Level Assistance with the following:;Bathing;Dressing;Toileting;Cooking;Housework;Shopping;Mobility   Current Functional Level Assistance with the following:;Housework;Shopping;Mobility;Bathing;Dressing;Toileting;Cooking   Can patient return to prior living arrangement No   Ability to make needs known: Good   Family able to assist with home care needs: Yes   Would you like for me to discuss the discharge plan with any other family members/significant others, and if so, who? No   Financial Resources Medicare   Social/Functional History   Lives With Spouse   Type of Home House   Home Equipment Walker - Rolling;Lift chair   Receives Help From Family   Active  No   Patient's  Info Spouse   Discharge Planning   Type of Residence House   Living Arrangements Spouse/Significant Other   Current Services Prior To Admission None   Potential Assistance Needed Skilled Nursing Facility   Type of Home Care Services None   Patient expects to be discharged to: Skilled nursing facility   History of falls? 1   Services At/After Discharge   Services At/After Discharge Skilled Nursing Facility (SNF)   Mode of Transport at Discharge BLS   Confirm Follow Up Transport Family   Condition of Participation: Discharge Planning   The Plan for Transition of Care is related to the following treatment goals: Patient agreeable to SNF placement   The Patient and/or Patient Representative was provided with a Choice of Provider? Patient   The Patient and/Or Patient Representative agree with the Discharge Plan? Yes   Freedom of Choice list was provided with basic dialogue that supports the patient's

## 2025-03-07 NOTE — PLAN OF CARE
Problem: Safety - Adult  Goal: Free from fall injury  Outcome: Progressing     Problem: Pain  Goal: Verbalizes/displays adequate comfort level or baseline comfort level  Outcome: Progressing     Problem: Skin/Tissue Integrity  Goal: Skin integrity remains intact  Description: 1.  Monitor for areas of redness and/or skin breakdown  2.  Assess vascular access sites hourly  3.  Every 4-6 hours minimum:  Change oxygen saturation probe site  4.  Every 4-6 hours:  If on nasal continuous positive airway pressure, respiratory therapy assess nares and determine need for appliance change or resting period  Outcome: Progressing     Problem: ABCDS Injury Assessment  Goal: Absence of physical injury  Outcome: Progressing      Lab Results   Component Value Date    5COL Yellow 11/30/2020    5UAPP Cloudy 11/30/2020    5UGLU Negative 11/30/2020    5UBILI Negative 11/30/2020    5UKET Negative 11/30/2020    5USPG 1.010 11/30/2020    5URBC Negative 11/30/2020    5UPH 7.0 11/30/2020    5UPROT Trace 11/30/2020    5UURP 0.2 11/30/2020    POCTUNITR Positive 11/30/2020    5UWBC Moderate 11/30/2020

## 2025-03-07 NOTE — PROGRESS NOTES
Met with patient in room 10. Patient is agreeable to be placed for rehab. Patient is also agreeable to be in the CP program. Will continue to follow.

## 2025-03-07 NOTE — PROGRESS NOTES
[facility]  Physical Therapy Initial Assessment    Name Darrell Sutton    1950   MRN [id number]   Date of Service 3/7/2025   Patient Room  010/010-01     Patient Diagnoses The primary encounter diagnosis was Acute respiratory failure with hypoxia (HCC). Diagnoses of Hypervolemia, unspecified hypervolemia type, Intertrigo, Abdominal wall cellulitis, and Recurrent falls were also pertinent to this visit.   Past Medical History  has a past medical history of Anxiety, Cancer (HCC), History of therapeutic radiation, Hx antineoplastic chemo, Hypertension, IBS (irritable bowel syndrome), Injury of breast, and Osteoarthritis.   Past Surgical History  has a past surgical history that includes Tonsillectomy; Tubal ligation; Breast surgery (2009); eye surgery (Right, ); Cholecystectomy, laparoscopic (2014); and Breast lumpectomy.       Discharge Recommendations  Skilled Nursing Facility  Equipment: Continue to assess    Assessment  Conditions Requiring Skilled Therapeutic Intervention: Decreased functional mobility, Decreased strength, Decreased endurance, Decreased ADL status, Decreased high level ADLs/IADLs, Increased pain and Decreased posture  Assessment of Evaluation: Patient is a 75 y.o. female who presents to [facility] with acute respiratory failure with hypoxia and multiple falls. Patient would benefit from continued skilled physical therapy services to address decreased strength, endurance, impaired balance, and decline in functional mobility.  Treatment Diagnosis: Muscle weakness (generalized), Decreased endurance, Difficulty walking, and History of falls, at risk for falls  Therapy Prognosis: Fair  Decision Making: Medium Complexity  Requires PT Follow-Up: Yes  Activity Tolerance: Patient tolerated evaluation without incident, Patient limited by fatigue, and Patient limited by pain     Plan  General Plan: 3+  Treatment Initiation: Strengthening, Endurance

## 2025-03-07 NOTE — ED PROVIDER NOTES
Emergency Department Encounter  Location: [facility] EMERGENCY DEPARTMENT    Patient: Darrell Sutton  MRN: [id number]  : 1950  Date of evaluation: 3/7/2025  ED Provider: Stephanie Purvis DO    Chief Complaint:    Fall (Multiple recent falls related to increasing leg weakness, especially the right leg, (EMS has been to the house 3 times today for lift assist), c/o right leg pain and chronic back pain related to degenerative disk and spinal stenosis)    [place]:  Darrell Sutton is a 75 y.o. female that presents to the emergency department after repeated falls.  Patient describes that she has been in her chair for about a week, unable to ambulate at all although she previously had ambulated with a walker.  Describes she has fallen multiple times, including 3 times today, at which point she had to call EMS to help her get up.  Describes that her legs \"just give up\".  She reports a long history of back pain related to spinal stenosis.  Patient initially states that she did not hit her head but later states she did hit her head it just \"was not a big deal.\".  Lives at home with her elderly .  Denies any recent fever, chills, urinary symptoms, vomiting or diarrhea.  No chest or abdominal pain.  No shortness of breath, cough or congestion.  Does endorse back pain which seems to be an ongoing concern for her      Past Medical History:   Diagnosis Date    Anxiety     Cancer (HCC)     ductal carcioma left    History of therapeutic radiation     Hx antineoplastic chemo     Hypertension     IBS (irritable bowel syndrome)     Injury of breast     Osteoarthritis      Past Surgical History:   Procedure Laterality Date    BREAST LUMPECTOMY      BREAST SURGERY  2009    left lumpectomy    CHOLECYSTECTOMY, LAPAROSCOPIC  2014    Robotic assisted laproscopic cholesystectomy    EYE SURGERY Right     Cataract Extraction    TONSILLECTOMY      TUBAL LIGATION       Family History   Problem Relation Age of  Criteria     Must be confirmed or suspected to move forward with diagnosis of sepsis.    Must meet 2:    [] Temperature > 100.9 F (38.3 C)        or < 96.8 F (36 C)  [x] HR > 90  [x] RR > 20  [x] WBC > 12 or < 4 or 10% bands      AND:      [x] Infection Confirmed or        Suspected.     Must meet 1:    [] Lactate > 2       or   [] Signs of Organ Dysfunction:    - SBP < 90 or MAP < 65  - Altered mental status  - Creatinine > 2 or increased from      baseline  - Urine Output < 0.5 ml/kg/hr  - Bilirubin > 2  - INR > 1.5 (not anticoagulated)  - Platelets < 100,000  - Acute Respiratory Failure as     evidenced by new need for NIPPV     or mechanical ventilation      [] No criteria met for Severe Sepsis.   Must meet 1:    [] Lactate > 4        or   [] SBP < 90 or MAP < 65 for at        least two readings in the first        hour after fluid bolus        administration      [] Vasopressors initiated (if hypotension persists after fluid resuscitation)        [] No criteria met for Septic Shock.   Patient Vitals for the past 6 hrs:   BP Temp Pulse Resp SpO2 Height Weight Percent Weight Change   03/07/25 0015 (!) 132/58 98.4 °F (36.9 °C) (!) 101 16 (!) 73 % 1.626 m (5' 4\") (!) 144.4 kg (318 lb 4.8 oz) 0   03/07/25 0016 -- -- -- -- (!) 73 % -- -- --   03/07/25 0017 -- -- -- -- (!) 87 % -- -- --   03/07/25 0018 -- -- (!) 101 16 90 % -- -- --   03/07/25 0030 121/61 -- (!) 107 26 95 % -- -- --   03/07/25 0100 127/62 -- (!) 101 21 95 % -- -- --   03/07/25 0128 -- -- (!) 104 25 93 % -- -- --   03/07/25 0130 (!) 120/57 -- (!) 104 26 93 % -- -- --   03/07/25 0145 (!) 111/56 -- (!) 101 17 94 % -- -- --   03/07/25 0200 (!) 117/54 -- (!) 104 22 95 % -- -- --   03/07/25 0230 (!) 103/55 -- (!) 106 19 97 % -- -- --   03/07/25 0245 (!) 110/55 -- (!) 103 21 93 % -- -- --   03/07/25 0300 (!) 110/56 -- (!) 109 20 91 % -- -- --   03/07/25 0315 (!) 111/55 -- 98 18 93 % -- -- --   03/07/25 0330 -- -- 94 17 94 % -- -- --   03/07/25 0345 (!)

## 2025-03-08 LAB
ANION GAP SERPL CALCULATED.3IONS-SCNC: 13 MMOL/L (ref 4–16)
B PARAP IS1001 DNA NPH QL NAA+NON-PROBE: NOT DETECTED
B PERT DNA SPEC QL NAA+PROBE: NOT DETECTED
BASOPHILS # BLD: 0.03 K/UL
BASOPHILS NFR BLD: 0 % (ref 0–1)
BUN SERPL-MCNC: 15 MG/DL (ref 6–23)
C PNEUM DNA NPH QL NAA+NON-PROBE: NOT DETECTED
CALCIUM SERPL-MCNC: 7.6 MG/DL (ref 8.3–10.6)
CHLORIDE SERPL-SCNC: 98 MMOL/L (ref 99–110)
CO2 SERPL-SCNC: 27 MMOL/L (ref 21–32)
CREAT SERPL-MCNC: 0.5 MG/DL (ref 0.6–1.1)
EOSINOPHIL # BLD: 0.09 K/UL
EOSINOPHILS RELATIVE PERCENT: 1 % (ref 0–3)
ERYTHROCYTE [DISTWIDTH] IN BLOOD BY AUTOMATED COUNT: 15.1 % (ref 11.7–14.9)
FLUAV RNA NPH QL NAA+NON-PROBE: NOT DETECTED
FLUBV RNA NPH QL NAA+NON-PROBE: NOT DETECTED
GFR, ESTIMATED: >90 ML/MIN/1.73M2
GLUCOSE SERPL-MCNC: 125 MG/DL (ref 74–99)
HADV DNA NPH QL NAA+NON-PROBE: NOT DETECTED
HCOV 229E RNA NPH QL NAA+NON-PROBE: NOT DETECTED
HCOV HKU1 RNA NPH QL NAA+NON-PROBE: NOT DETECTED
HCOV NL63 RNA NPH QL NAA+NON-PROBE: NOT DETECTED
HCOV OC43 RNA NPH QL NAA+NON-PROBE: NOT DETECTED
HCT VFR BLD AUTO: 37.8 % (ref 37–47)
HGB BLD-MCNC: 11.3 G/DL (ref 12.5–16)
HMPV RNA NPH QL NAA+NON-PROBE: NOT DETECTED
HPIV1 RNA NPH QL NAA+NON-PROBE: NOT DETECTED
HPIV2 RNA NPH QL NAA+NON-PROBE: NOT DETECTED
HPIV3 RNA NPH QL NAA+NON-PROBE: NOT DETECTED
HPIV4 RNA NPH QL NAA+NON-PROBE: NOT DETECTED
IMM GRANULOCYTES # BLD AUTO: 0.12 K/UL
IMM GRANULOCYTES NFR BLD: 1 %
LYMPHOCYTES NFR BLD: 0.85 K/UL
LYMPHOCYTES RELATIVE PERCENT: 6 % (ref 24–44)
M PNEUMO DNA NPH QL NAA+NON-PROBE: NOT DETECTED
MCH RBC QN AUTO: 27.6 PG (ref 27–31)
MCHC RBC AUTO-ENTMCNC: 29.9 G/DL (ref 32–36)
MCV RBC AUTO: 92.4 FL (ref 78–100)
MONOCYTES NFR BLD: 0.82 K/UL
MONOCYTES NFR BLD: 6 % (ref 0–4)
NEUTROPHILS NFR BLD: 86 % (ref 36–66)
NEUTS SEG NFR BLD: 11.41 K/UL
PLATELET # BLD AUTO: 280 K/UL (ref 140–440)
PMV BLD AUTO: 10.8 FL (ref 7.5–11.1)
POTASSIUM SERPL-SCNC: 3.9 MMOL/L (ref 3.5–5.1)
PROCALCITONIN SERPL-MCNC: 0.12 NG/ML
RBC # BLD AUTO: 4.09 M/UL (ref 4.2–5.4)
RSV RNA NPH QL NAA+NON-PROBE: NOT DETECTED
RV+EV RNA NPH QL NAA+NON-PROBE: NOT DETECTED
SARS-COV-2 RNA NPH QL NAA+NON-PROBE: NOT DETECTED
SODIUM SERPL-SCNC: 138 MMOL/L (ref 135–145)
SPECIMEN DESCRIPTION: NORMAL
WBC OTHER # BLD: 13.3 K/UL (ref 4–10.5)

## 2025-03-08 PROCEDURE — 6360000002 HC RX W HCPCS: Performed by: STUDENT IN AN ORGANIZED HEALTH CARE EDUCATION/TRAINING PROGRAM

## 2025-03-08 PROCEDURE — 85025 COMPLETE CBC W/AUTO DIFF WBC: CPT

## 2025-03-08 PROCEDURE — 36415 COLL VENOUS BLD VENIPUNCTURE: CPT

## 2025-03-08 PROCEDURE — 6370000000 HC RX 637 (ALT 250 FOR IP): Performed by: STUDENT IN AN ORGANIZED HEALTH CARE EDUCATION/TRAINING PROGRAM

## 2025-03-08 PROCEDURE — 6370000000 HC RX 637 (ALT 250 FOR IP): Performed by: NURSE PRACTITIONER

## 2025-03-08 PROCEDURE — 1200000000 HC SEMI PRIVATE

## 2025-03-08 PROCEDURE — 94761 N-INVAS EAR/PLS OXIMETRY MLT: CPT

## 2025-03-08 PROCEDURE — 84145 PROCALCITONIN (PCT): CPT

## 2025-03-08 PROCEDURE — 80048 BASIC METABOLIC PNL TOTAL CA: CPT

## 2025-03-08 PROCEDURE — 0202U NFCT DS 22 TRGT SARS-COV-2: CPT

## 2025-03-08 PROCEDURE — 2500000003 HC RX 250 WO HCPCS: Performed by: STUDENT IN AN ORGANIZED HEALTH CARE EDUCATION/TRAINING PROGRAM

## 2025-03-08 PROCEDURE — 2700000000 HC OXYGEN THERAPY PER DAY

## 2025-03-08 RX ORDER — FUROSEMIDE 40 MG/1
40 TABLET ORAL DAILY
Status: DISCONTINUED | OUTPATIENT
Start: 2025-03-09 | End: 2025-03-11 | Stop reason: HOSPADM

## 2025-03-08 RX ORDER — NICOTINE 21 MG/24HR
1 PATCH, TRANSDERMAL 24 HOURS TRANSDERMAL DAILY
Status: DISCONTINUED | OUTPATIENT
Start: 2025-03-08 | End: 2025-03-11 | Stop reason: HOSPADM

## 2025-03-08 RX ADMIN — SODIUM CHLORIDE, PRESERVATIVE FREE 10 ML: 5 INJECTION INTRAVENOUS at 20:22

## 2025-03-08 RX ADMIN — GABAPENTIN 200 MG: 100 CAPSULE ORAL at 15:35

## 2025-03-08 RX ADMIN — OXYCODONE AND ACETAMINOPHEN 1 TABLET: 5; 325 TABLET ORAL at 02:00

## 2025-03-08 RX ADMIN — CITALOPRAM HYDROBROMIDE 40 MG: 20 TABLET ORAL at 10:01

## 2025-03-08 RX ADMIN — OXYCODONE AND ACETAMINOPHEN 1 TABLET: 5; 325 TABLET ORAL at 17:29

## 2025-03-08 RX ADMIN — AMLODIPINE BESYLATE 10 MG: 10 TABLET ORAL at 10:01

## 2025-03-08 RX ADMIN — ALPRAZOLAM 1 MG: 0.5 TABLET ORAL at 15:35

## 2025-03-08 RX ADMIN — GABAPENTIN 200 MG: 100 CAPSULE ORAL at 20:22

## 2025-03-08 RX ADMIN — ALPRAZOLAM 1 MG: 0.5 TABLET ORAL at 20:22

## 2025-03-08 RX ADMIN — FUROSEMIDE 40 MG: 10 INJECTION, SOLUTION INTRAMUSCULAR; INTRAVENOUS at 11:07

## 2025-03-08 RX ADMIN — ENOXAPARIN SODIUM 30 MG: 100 INJECTION SUBCUTANEOUS at 20:21

## 2025-03-08 RX ADMIN — SODIUM CHLORIDE, PRESERVATIVE FREE 10 ML: 5 INJECTION INTRAVENOUS at 11:08

## 2025-03-08 RX ADMIN — PANTOPRAZOLE SODIUM 40 MG: 40 TABLET, DELAYED RELEASE ORAL at 10:01

## 2025-03-08 RX ADMIN — MICONAZOLE NITRATE: 2 POWDER TOPICAL at 20:22

## 2025-03-08 RX ADMIN — GABAPENTIN 200 MG: 100 CAPSULE ORAL at 10:01

## 2025-03-08 RX ADMIN — ENOXAPARIN SODIUM 30 MG: 100 INJECTION SUBCUTANEOUS at 10:00

## 2025-03-08 RX ADMIN — MICONAZOLE NITRATE: 2 POWDER TOPICAL at 10:04

## 2025-03-08 RX ADMIN — ALPRAZOLAM 1 MG: 0.5 TABLET ORAL at 10:01

## 2025-03-08 ASSESSMENT — PAIN DESCRIPTION - ONSET
ONSET: GRADUAL
ONSET: AWAKENED FROM SLEEP

## 2025-03-08 ASSESSMENT — PAIN DESCRIPTION - PAIN TYPE
TYPE: CHRONIC PAIN
TYPE: CHRONIC PAIN

## 2025-03-08 ASSESSMENT — PAIN DESCRIPTION - ORIENTATION
ORIENTATION: LOWER
ORIENTATION: LOWER;MID

## 2025-03-08 ASSESSMENT — PAIN DESCRIPTION - DESCRIPTORS
DESCRIPTORS: ACHING
DESCRIPTORS: ACHING

## 2025-03-08 ASSESSMENT — PAIN DESCRIPTION - FREQUENCY
FREQUENCY: CONTINUOUS
FREQUENCY: CONTINUOUS

## 2025-03-08 ASSESSMENT — PAIN DESCRIPTION - LOCATION
LOCATION: BACK
LOCATION: BACK

## 2025-03-08 ASSESSMENT — PAIN SCALES - GENERAL
PAINLEVEL_OUTOF10: 5
PAINLEVEL_OUTOF10: 0
PAINLEVEL_OUTOF10: 8
PAINLEVEL_OUTOF10: 8

## 2025-03-08 ASSESSMENT — PAIN - FUNCTIONAL ASSESSMENT
PAIN_FUNCTIONAL_ASSESSMENT: ACTIVITIES ARE NOT PREVENTED
PAIN_FUNCTIONAL_ASSESSMENT: ACTIVITIES ARE NOT PREVENTED

## 2025-03-08 NOTE — PROGRESS NOTES
Scalp/Soft tissues:  No focal abnormality IMPRESSION: 1.   No acute intracranial findings.  There is no evidence for hemorrhage, mass effect, or acute large territory infarct. This dictation was created with voice recognition software.  While attempts have  been made to review the dictation as it is transcribed, on occasion the spoken word can be misinterpreted by the technology leading to omissions or inappropriate words, phrases or sentences.  Dictated and Electronically Signed By: Kristal Pretty MD 3/7/2025 1:41        XR CHEST PORTABLE  Result Date: 3/7/2025  EXAMINATION: XR CHEST PORTABLE DATE OF EXAM:  3/7/2025 1:29 DEMOGRAPHICS: 75 years old Female INDICATION: chest pain COMPARISON: No existing relevant imaging study corresponding to the same anatomical region is available. FINDINGS:     Heart size is enlarged. Lung bases details limited due to body habitus and low volume portable technique. Lungs are clear otherwise. There is no pneumothorax or large effusion. IMPRESSION: 1.  No acute abnormality in the chest This dictation was created with voice recognition software.  While attempts have  been made to review the dictation as it is transcribed, on occasion the spoken word can be misinterpreted by the technology leading to omissions or inappropriate words, phrases or sentences.  Dictated and Electronically Signed By: Cj Moore MD 3/7/2025 0:55        XR TIBIA FIBULA RIGHT (2 VIEWS)  Result Date: 3/2/2025  PROCEDURE: XR TIBIA FIBULA RIGHT (2 VIEWS) DATE OF EXAM:  3/2/2025 6:41 DEMOGRAPHICS: 75 years old Female INDICATION: trauma TECHNIQUE: XR TIBIA FIBULA RIGHT (2 VIEWS) COMPARISON: No existing relevant imaging study corresponding to the same anatomical region is available. FINDINGS:  No acute fracture of the tibia or fibula. Calcaneal enthesopathy. Soft tissue edema. Degenerative changes of the midfoot. IMPRESSION:  1.  No acute fracture of the tibia or fibula.  Dictated and Electronically Signed By: Mikhail  \"BLOODCULT2\"  Organism: No results found for: \"ORG\"      Electronically signed by ERNST Briggs CNP on 3/8/2025 at 8:05 AM

## 2025-03-08 NOTE — PLAN OF CARE
Problem: Safety - Adult  Goal: Free from fall injury  Outcome: Progressing     Problem: Pain  Goal: Verbalizes/displays adequate comfort level or baseline comfort level  Outcome: Progressing     Problem: Skin/Tissue Integrity  Goal: Skin integrity remains intact  Description: 1.  Monitor for areas of redness and/or skin breakdown  2.  Assess vascular access sites hourly  3.  Every 4-6 hours minimum:  Change oxygen saturation probe site  4.  Every 4-6 hours:  If on nasal continuous positive airway pressure, respiratory therapy assess nares and determine need for appliance change or resting period  Outcome: Progressing     Problem: ABCDS Injury Assessment  Goal: Absence of physical injury  Outcome: Progressing     Problem: Discharge Planning  Goal: Discharge to home or other facility with appropriate resources  Outcome: Progressing

## 2025-03-09 LAB
ALBUMIN SERPL-MCNC: 2.4 G/DL (ref 3.4–5)
ALBUMIN/GLOB SERPL: 0.6 {RATIO} (ref 1.1–2.2)
ALP SERPL-CCNC: 110 U/L (ref 40–129)
ALT SERPL-CCNC: 8 U/L (ref 10–40)
AMPHET UR QL SCN: NEGATIVE
ANION GAP SERPL CALCULATED.3IONS-SCNC: 18 MMOL/L (ref 4–16)
AST SERPL-CCNC: 22 U/L (ref 15–37)
BARBITURATES UR QL SCN: NEGATIVE
BENZODIAZ UR QL: POSITIVE
BILIRUB SERPL-MCNC: 0.5 MG/DL (ref 0–1)
BILIRUB UR QL STRIP: NEGATIVE
BUN SERPL-MCNC: 12 MG/DL (ref 6–23)
CALCIUM SERPL-MCNC: 8.2 MG/DL (ref 8.3–10.6)
CANNABINOIDS UR QL SCN: NEGATIVE
CHLORIDE SERPL-SCNC: 97 MMOL/L (ref 99–110)
CLARITY UR: CLEAR
CO2 SERPL-SCNC: 20 MMOL/L (ref 21–32)
COCAINE UR QL SCN: NEGATIVE
COLOR UR: YELLOW
COMMENT: NORMAL
CREAT SERPL-MCNC: 0.4 MG/DL (ref 0.6–1.1)
ERYTHROCYTE [DISTWIDTH] IN BLOOD BY AUTOMATED COUNT: 15.5 % (ref 11.7–14.9)
FENTANYL UR QL: NEGATIVE
GFR, ESTIMATED: >90 ML/MIN/1.73M2
GLUCOSE SERPL-MCNC: 75 MG/DL (ref 74–99)
GLUCOSE UR STRIP-MCNC: NEGATIVE MG/DL
HCT VFR BLD AUTO: 41.7 % (ref 37–47)
HGB BLD-MCNC: 12.2 G/DL (ref 12.5–16)
HGB UR QL STRIP.AUTO: NEGATIVE
KETONES UR STRIP-MCNC: NEGATIVE MG/DL
LEUKOCYTE ESTERASE UR QL STRIP: NEGATIVE
MCH RBC QN AUTO: 27.4 PG (ref 27–31)
MCHC RBC AUTO-ENTMCNC: 29.3 G/DL (ref 32–36)
MCV RBC AUTO: 93.5 FL (ref 78–100)
NITRITE UR QL STRIP: NEGATIVE
OPIATES UR QL SCN: NEGATIVE
OXYCODONE UR QL SCN: POSITIVE
PH UR STRIP: 7.5 [PH] (ref 5–8)
PLATELET # BLD AUTO: 276 K/UL (ref 140–440)
PMV BLD AUTO: 11.5 FL (ref 7.5–11.1)
POTASSIUM SERPL-SCNC: 4.7 MMOL/L (ref 3.5–5.1)
PROT SERPL-MCNC: 6.4 G/DL (ref 6.4–8.2)
PROT UR STRIP-MCNC: NEGATIVE MG/DL
RBC # BLD AUTO: 4.46 M/UL (ref 4.2–5.4)
SODIUM SERPL-SCNC: 135 MMOL/L (ref 135–145)
SP GR UR STRIP: 1.01 (ref 1–1.03)
TEST INFORMATION: ABNORMAL
UROBILINOGEN UR STRIP-ACNC: 0.2 EU/DL (ref 0–1)
WBC OTHER # BLD: 16.1 K/UL (ref 4–10.5)

## 2025-03-09 PROCEDURE — 80053 COMPREHEN METABOLIC PANEL: CPT

## 2025-03-09 PROCEDURE — 80307 DRUG TEST PRSMV CHEM ANLYZR: CPT

## 2025-03-09 PROCEDURE — 1200000000 HC SEMI PRIVATE

## 2025-03-09 PROCEDURE — 6370000000 HC RX 637 (ALT 250 FOR IP): Performed by: NURSE PRACTITIONER

## 2025-03-09 PROCEDURE — 81003 URINALYSIS AUTO W/O SCOPE: CPT

## 2025-03-09 PROCEDURE — 6360000002 HC RX W HCPCS: Performed by: STUDENT IN AN ORGANIZED HEALTH CARE EDUCATION/TRAINING PROGRAM

## 2025-03-09 PROCEDURE — 36415 COLL VENOUS BLD VENIPUNCTURE: CPT

## 2025-03-09 PROCEDURE — 85027 COMPLETE CBC AUTOMATED: CPT

## 2025-03-09 PROCEDURE — 2500000003 HC RX 250 WO HCPCS: Performed by: STUDENT IN AN ORGANIZED HEALTH CARE EDUCATION/TRAINING PROGRAM

## 2025-03-09 PROCEDURE — 6370000000 HC RX 637 (ALT 250 FOR IP): Performed by: STUDENT IN AN ORGANIZED HEALTH CARE EDUCATION/TRAINING PROGRAM

## 2025-03-09 RX ADMIN — OXYCODONE AND ACETAMINOPHEN 1 TABLET: 5; 325 TABLET ORAL at 20:30

## 2025-03-09 RX ADMIN — MICONAZOLE NITRATE: 2 POWDER TOPICAL at 15:31

## 2025-03-09 RX ADMIN — ENOXAPARIN SODIUM 30 MG: 100 INJECTION SUBCUTANEOUS at 20:29

## 2025-03-09 RX ADMIN — PANTOPRAZOLE SODIUM 40 MG: 40 TABLET, DELAYED RELEASE ORAL at 10:46

## 2025-03-09 RX ADMIN — SODIUM CHLORIDE, PRESERVATIVE FREE 10 ML: 5 INJECTION INTRAVENOUS at 15:35

## 2025-03-09 RX ADMIN — ALPRAZOLAM 1 MG: 0.5 TABLET ORAL at 15:31

## 2025-03-09 RX ADMIN — ALPRAZOLAM 1 MG: 0.5 TABLET ORAL at 20:30

## 2025-03-09 RX ADMIN — ENOXAPARIN SODIUM 30 MG: 100 INJECTION SUBCUTANEOUS at 10:45

## 2025-03-09 RX ADMIN — CITALOPRAM HYDROBROMIDE 40 MG: 20 TABLET ORAL at 10:46

## 2025-03-09 RX ADMIN — FUROSEMIDE 40 MG: 40 TABLET ORAL at 10:46

## 2025-03-09 RX ADMIN — GABAPENTIN 200 MG: 100 CAPSULE ORAL at 20:29

## 2025-03-09 RX ADMIN — GABAPENTIN 200 MG: 100 CAPSULE ORAL at 15:31

## 2025-03-09 RX ADMIN — SODIUM CHLORIDE, PRESERVATIVE FREE 10 ML: 5 INJECTION INTRAVENOUS at 20:32

## 2025-03-09 RX ADMIN — GABAPENTIN 200 MG: 100 CAPSULE ORAL at 10:46

## 2025-03-09 RX ADMIN — ALPRAZOLAM 1 MG: 0.5 TABLET ORAL at 10:46

## 2025-03-09 ASSESSMENT — PAIN DESCRIPTION - ORIENTATION: ORIENTATION: MID;LOWER

## 2025-03-09 ASSESSMENT — PAIN DESCRIPTION - ONSET: ONSET: PROGRESSIVE

## 2025-03-09 ASSESSMENT — PAIN DESCRIPTION - LOCATION: LOCATION: BACK

## 2025-03-09 ASSESSMENT — PAIN DESCRIPTION - DESCRIPTORS: DESCRIPTORS: ACHING

## 2025-03-09 ASSESSMENT — PAIN DESCRIPTION - PAIN TYPE: TYPE: CHRONIC PAIN

## 2025-03-09 ASSESSMENT — PAIN - FUNCTIONAL ASSESSMENT: PAIN_FUNCTIONAL_ASSESSMENT: ACTIVITIES ARE NOT PREVENTED

## 2025-03-09 ASSESSMENT — PAIN SCALES - GENERAL
PAINLEVEL_OUTOF10: 8
PAINLEVEL_OUTOF10: 3

## 2025-03-09 ASSESSMENT — PAIN DESCRIPTION - FREQUENCY: FREQUENCY: INTERMITTENT

## 2025-03-09 NOTE — PROGRESS NOTES
V2.0  [facility] Hospitalist Progress Note      Name:  Darrell Sutton /Age/Sex: 1950  (75 y.o. female)   MRN & CSN:  [id number] & [id number] Encounter Date/Time: 3/9/2025 2:38 PM EDT    Location:   PCP: Max Koo MD       Hospital Day: 3    Assessment and Plan:   Darrell Sutton is a 75 y.o. female who presents with Cellulitis    Ambulatory dysfunction  Generalized weakness  Recurrent falls  -Likely due to physical deconditioning, bilateral lower extremity weakness, polypharmacy. Does have a history of spinal stenosis which may be contributing  -Trauma imaging negative for acute fracture  -PT/OT recommended SNF.  CM following.  -Dispo: Referral to [facility] for rehab.    Acute respiratory failure with hypoxia  -SpO2 73% on room air in ED.  Required up to 6L NC, weaned to 4 L.  -Concern for volume overload  -CXR with no acute process  - -May be falsely low due to obesity  -No recent echo on file  -Longstanding history of smoking/vaping.  Would benefit from PFTs as outpatient.  Likely has underlying COPD.  -S/p IV diuresis, transitioned to p.o.  -Continue to wean O2 as able    Acute metabolic encephalopathy  -Patient is having intermittent periods of confusion and hallucinations while hospitalized  -Likely multifactorial in setting of hypoxia, hospital-acquired delirium, polypharmacy.  Will rule out UTI.  -Delirium precautions, scheduled melatonin  -Patient was alert and oriented x 4 this morning.    Chronic bilateral lower extremity edema  -May be secondary to lymphedema versus hypervolemia  -Echo pending  -Received IV diuresis with some improvement    SIRS  -Criteria met on admission with leukocytosis and tachycardia  -Afebrile, lactic acid normal  -Patient was given vancomycin and cefepime in ED for suspected cellulitis of her groin/inframammary area.  On exam this appears to be fungal infection.  Continue micontin.  -Leukocytosis did worsen today.  Check UA.    Chronic back pain  with opioid dependence  Spinal stenosis  -Patient not interested in back surgery  -Was recently switched from Percocet to p.o. Dilaudid which likely contributed to patient's weakness. Continue Percocet for now.     This patient was discussed with Dr. Hinds. He was agreeable with the assessment and plan as dictated above.     Current Living situation: home  Expected Disposition: [facility]  Estimated D/C: 1-2 days    Diet ADULT DIET; Regular   DVT Prophylaxis [x] Lovenox, []  Heparin, [] SCDs, [] Ambulation,  [] Eliquis, [] Xarelto []Coumadin   Code Status DNR-CCA   Disposition Patient requires continued admission due to hypoxia.    Surrogate Decision Maker/ FEMI Sutton     Personally reviewed Lab Studies and Imaging       Subjective:     Chief Complaint: Fall (Multiple recent falls related to increasing leg weakness, especially the right leg, (EMS has been to the house 3 times today for lift assist), c/o right leg pain and chronic back pain related to degenerative disk and spinal stenosis)     Patient seen and examined at bedside.  Noted to have some intermittent confusion over the last 24 hours.  Discussed plan of care with patient as well as  at bedside.  Improved this morning.  Review of Systems:    Pertinent positives and negatives discussed in HPI    Objective:     Intake/Output Summary (Last 24 hours) at 3/9/2025 1438  Last data filed at 3/9/2025 1233  Gross per 24 hour   Intake 240 ml   Output 2200 ml   Net -1960 ml        Vitals:   Vitals:    03/09/25 1043   BP: 120/62   Pulse: 98   Resp:    Temp:    SpO2: 97%       Physical Exam:   General: NAD, obese  Eyes: EOMI  ENT: neck supple  Cardiovascular: Regular rate and rhythm  Respiratory: Clear to auscultation, respirations even and unlabored on RA  Gastrointestinal: Soft, non tender  Genitourinary: no suprapubic tenderness  Musculoskeletal: Mild bilateral lower extremity nonpitting edema.  Skin: warm, dry, intertrigo under bilateral breasts and in

## 2025-03-09 NOTE — PLAN OF CARE
Problem: Safety - Adult  Goal: Free from fall injury  3/8/2025 2015 by Laurie Barakat RN  Outcome: Progressing  3/8/2025 1621 by Ian Falcon RN  Outcome: Progressing     Problem: Pain  Goal: Verbalizes/displays adequate comfort level or baseline comfort level  3/8/2025 2015 by Laurie Barakat RN  Outcome: Progressing  3/8/2025 1621 by Ian Falcon RN  Outcome: Progressing     Problem: Skin/Tissue Integrity  Goal: Skin integrity remains intact  Description: 1.  Monitor for areas of redness and/or skin breakdown  2.  Assess vascular access sites hourly  3.  Every 4-6 hours minimum:  Change oxygen saturation probe site  4.  Every 4-6 hours:  If on nasal continuous positive airway pressure, respiratory therapy assess nares and determine need for appliance change or resting period  3/8/2025 2015 by Laurie Barakat RN  Outcome: Progressing  3/8/2025 1621 by Ian Falcon RN  Outcome: Progressing     Problem: ABCDS Injury Assessment  Goal: Absence of physical injury  3/8/2025 2015 by Laurie Barakat RN  Outcome: Progressing  3/8/2025 1621 by Ian Falcon RN  Outcome: Progressing     Problem: Discharge Planning  Goal: Discharge to home or other facility with appropriate resources  3/8/2025 2015 by Laurie Barakat RN  Outcome: Progressing  3/8/2025 1621 by Ian Falcon RN  Outcome: Progressing

## 2025-03-10 ENCOUNTER — APPOINTMENT (OUTPATIENT)
Age: 75
DRG: 189 | End: 2025-03-10
Payer: MEDICARE

## 2025-03-10 LAB
ANION GAP SERPL CALCULATED.3IONS-SCNC: 8 MMOL/L (ref 4–16)
BASOPHILS # BLD: 0.04 K/UL
BASOPHILS NFR BLD: 0 % (ref 0–1)
BUN SERPL-MCNC: 9 MG/DL (ref 6–23)
CALCIUM SERPL-MCNC: 7.6 MG/DL (ref 8.3–10.6)
CHLORIDE SERPL-SCNC: 99 MMOL/L (ref 99–110)
CO2 SERPL-SCNC: 33 MMOL/L (ref 21–32)
CREAT SERPL-MCNC: 0.4 MG/DL (ref 0.6–1.1)
CRP SERPL HS-MCNC: 120 MG/L (ref 0–5)
ECHO AO ROOT DIAM: 2.8 CM
ECHO AO ROOT INDEX: 1.21 CM/M2
ECHO AV AREA PEAK VELOCITY: 1.7 CM2
ECHO AV AREA VTI: 2 CM2
ECHO AV AREA/BSA PEAK VELOCITY: 0.7 CM2/M2
ECHO AV AREA/BSA VTI: 0.9 CM2/M2
ECHO AV MEAN GRADIENT: 8 MMHG
ECHO AV MEAN VELOCITY: 1.4 M/S
ECHO AV PEAK GRADIENT: 11 MMHG
ECHO AV PEAK VELOCITY: 1.7 M/S
ECHO AV VELOCITY RATIO: 0.47
ECHO AV VTI: 29.4 CM
ECHO BSA: 2.46 M2
ECHO EST RA PRESSURE: 3 MMHG
ECHO IVC EXP: 1.7 CM
ECHO LA AREA 4C: 12.9 CM2
ECHO LA DIAMETER INDEX: 1.39 CM/M2
ECHO LA DIAMETER: 3.2 CM
ECHO LA MAJOR AXIS: 5.2 CM
ECHO LA TO AORTIC ROOT RATIO: 1.14
ECHO LA VOL MOD A4C: 27 ML (ref 22–52)
ECHO LA VOLUME INDEX MOD A4C: 12 ML/M2 (ref 16–34)
ECHO LV E' LATERAL VELOCITY: 6.48 CM/S
ECHO LV E' SEPTAL VELOCITY: 7.54 CM/S
ECHO LV EDV A4C: 55 ML
ECHO LV EDV INDEX A4C: 24 ML/M2
ECHO LV EF PHYSICIAN: 55 %
ECHO LV EJECTION FRACTION A4C: 58 %
ECHO LV ESV A4C: 23 ML
ECHO LV ESV INDEX A4C: 10 ML/M2
ECHO LV FRACTIONAL SHORTENING: 38 % (ref 28–44)
ECHO LV INTERNAL DIMENSION DIASTOLE INDEX: 2.08 CM/M2
ECHO LV INTERNAL DIMENSION DIASTOLIC: 4.8 CM (ref 3.9–5.3)
ECHO LV INTERNAL DIMENSION SYSTOLIC INDEX: 1.3 CM/M2
ECHO LV INTERNAL DIMENSION SYSTOLIC: 3 CM
ECHO LV IVSD: 0.9 CM (ref 0.6–0.9)
ECHO LV MASS 2D: 181.9 G (ref 67–162)
ECHO LV MASS INDEX 2D: 78.7 G/M2 (ref 43–95)
ECHO LV POSTERIOR WALL DIASTOLIC: 1.2 CM (ref 0.6–0.9)
ECHO LV RELATIVE WALL THICKNESS RATIO: 0.5
ECHO LVOT AREA: 3.5 CM2
ECHO LVOT AV VTI INDEX: 0.56
ECHO LVOT DIAM: 2.1 CM
ECHO LVOT MEAN GRADIENT: 2 MMHG
ECHO LVOT PEAK GRADIENT: 3 MMHG
ECHO LVOT PEAK VELOCITY: 0.8 M/S
ECHO LVOT STROKE VOLUME INDEX: 24.9 ML/M2
ECHO LVOT SV: 57.5 ML
ECHO LVOT VTI: 16.6 CM
ECHO MV A VELOCITY: 1.05 M/S
ECHO MV E DECELERATION TIME (DT): 180 MS
ECHO MV E VELOCITY: 0.79 M/S
ECHO MV E/A RATIO: 0.75
ECHO MV E/E' LATERAL: 12.19
ECHO MV E/E' RATIO (AVERAGED): 11.33
ECHO MV E/E' SEPTAL: 10.48
ECHO RIGHT VENTRICULAR SYSTOLIC PRESSURE (RVSP): 18 MMHG
ECHO RV INTERNAL DIMENSION: 3.1 CM
ECHO TV REGURGITANT MAX VELOCITY: 1.91 M/S
ECHO TV REGURGITANT PEAK GRADIENT: 15 MMHG
EOSINOPHIL # BLD: 0.18 K/UL
EOSINOPHILS RELATIVE PERCENT: 1 % (ref 0–3)
ERYTHROCYTE [DISTWIDTH] IN BLOOD BY AUTOMATED COUNT: 15.3 % (ref 11.7–14.9)
GFR, ESTIMATED: >90 ML/MIN/1.73M2
GLUCOSE SERPL-MCNC: 123 MG/DL (ref 74–99)
HCT VFR BLD AUTO: 38.5 % (ref 37–47)
HGB BLD-MCNC: 11.7 G/DL (ref 12.5–16)
IMM GRANULOCYTES # BLD AUTO: 0.08 K/UL
IMM GRANULOCYTES NFR BLD: 1 %
LYMPHOCYTES NFR BLD: 0.84 K/UL
LYMPHOCYTES RELATIVE PERCENT: 6 % (ref 24–44)
MCH RBC QN AUTO: 27.6 PG (ref 27–31)
MCHC RBC AUTO-ENTMCNC: 30.4 G/DL (ref 32–36)
MCV RBC AUTO: 90.8 FL (ref 78–100)
MONOCYTES NFR BLD: 0.7 K/UL
MONOCYTES NFR BLD: 5 % (ref 0–4)
NEUTROPHILS NFR BLD: 86 % (ref 36–66)
NEUTS SEG NFR BLD: 11.29 K/UL
PLATELET # BLD AUTO: 307 K/UL (ref 140–440)
PMV BLD AUTO: 10.1 FL (ref 7.5–11.1)
POTASSIUM SERPL-SCNC: 3.2 MMOL/L (ref 3.5–5.1)
PROCALCITONIN SERPL-MCNC: 0.09 NG/ML
RBC # BLD AUTO: 4.24 M/UL (ref 4.2–5.4)
SODIUM SERPL-SCNC: 140 MMOL/L (ref 135–145)
WBC OTHER # BLD: 13.1 K/UL (ref 4–10.5)

## 2025-03-10 PROCEDURE — 84145 PROCALCITONIN (PCT): CPT

## 2025-03-10 PROCEDURE — 97110 THERAPEUTIC EXERCISES: CPT

## 2025-03-10 PROCEDURE — 93306 TTE W/DOPPLER COMPLETE: CPT

## 2025-03-10 PROCEDURE — 93306 TTE W/DOPPLER COMPLETE: CPT | Performed by: INTERNAL MEDICINE

## 2025-03-10 PROCEDURE — 80048 BASIC METABOLIC PNL TOTAL CA: CPT

## 2025-03-10 PROCEDURE — 2500000003 HC RX 250 WO HCPCS: Performed by: STUDENT IN AN ORGANIZED HEALTH CARE EDUCATION/TRAINING PROGRAM

## 2025-03-10 PROCEDURE — 97530 THERAPEUTIC ACTIVITIES: CPT

## 2025-03-10 PROCEDURE — 85025 COMPLETE CBC W/AUTO DIFF WBC: CPT

## 2025-03-10 PROCEDURE — 6360000002 HC RX W HCPCS: Performed by: STUDENT IN AN ORGANIZED HEALTH CARE EDUCATION/TRAINING PROGRAM

## 2025-03-10 PROCEDURE — 6370000000 HC RX 637 (ALT 250 FOR IP): Performed by: PHYSICIAN ASSISTANT

## 2025-03-10 PROCEDURE — 6370000000 HC RX 637 (ALT 250 FOR IP): Performed by: NURSE PRACTITIONER

## 2025-03-10 PROCEDURE — 6370000000 HC RX 637 (ALT 250 FOR IP): Performed by: STUDENT IN AN ORGANIZED HEALTH CARE EDUCATION/TRAINING PROGRAM

## 2025-03-10 PROCEDURE — 1200000000 HC SEMI PRIVATE

## 2025-03-10 PROCEDURE — 36415 COLL VENOUS BLD VENIPUNCTURE: CPT

## 2025-03-10 PROCEDURE — 94761 N-INVAS EAR/PLS OXIMETRY MLT: CPT

## 2025-03-10 PROCEDURE — 2700000000 HC OXYGEN THERAPY PER DAY

## 2025-03-10 PROCEDURE — 86140 C-REACTIVE PROTEIN: CPT

## 2025-03-10 RX ORDER — POTASSIUM CHLORIDE 1500 MG/1
40 TABLET, EXTENDED RELEASE ORAL ONCE
Status: COMPLETED | OUTPATIENT
Start: 2025-03-10 | End: 2025-03-10

## 2025-03-10 RX ADMIN — SODIUM CHLORIDE, PRESERVATIVE FREE 10 ML: 5 INJECTION INTRAVENOUS at 20:23

## 2025-03-10 RX ADMIN — CITALOPRAM HYDROBROMIDE 40 MG: 20 TABLET ORAL at 09:39

## 2025-03-10 RX ADMIN — SODIUM CHLORIDE, PRESERVATIVE FREE 10 ML: 5 INJECTION INTRAVENOUS at 16:15

## 2025-03-10 RX ADMIN — MICONAZOLE NITRATE: 2 POWDER TOPICAL at 09:40

## 2025-03-10 RX ADMIN — ALPRAZOLAM 1 MG: 0.5 TABLET ORAL at 15:51

## 2025-03-10 RX ADMIN — GABAPENTIN 200 MG: 100 CAPSULE ORAL at 20:23

## 2025-03-10 RX ADMIN — GABAPENTIN 200 MG: 100 CAPSULE ORAL at 15:51

## 2025-03-10 RX ADMIN — POLYETHYLENE GLYCOL 3350 17 G: 17 POWDER, FOR SOLUTION ORAL at 13:30

## 2025-03-10 RX ADMIN — PANTOPRAZOLE SODIUM 40 MG: 40 TABLET, DELAYED RELEASE ORAL at 09:39

## 2025-03-10 RX ADMIN — POTASSIUM CHLORIDE 40 MEQ: 1500 TABLET, EXTENDED RELEASE ORAL at 15:52

## 2025-03-10 RX ADMIN — ALPRAZOLAM 1 MG: 0.5 TABLET ORAL at 09:39

## 2025-03-10 RX ADMIN — ENOXAPARIN SODIUM 30 MG: 100 INJECTION SUBCUTANEOUS at 20:23

## 2025-03-10 RX ADMIN — Medication 3 MG: at 20:23

## 2025-03-10 RX ADMIN — OXYCODONE AND ACETAMINOPHEN 1 TABLET: 5; 325 TABLET ORAL at 13:29

## 2025-03-10 RX ADMIN — FUROSEMIDE 40 MG: 40 TABLET ORAL at 09:39

## 2025-03-10 RX ADMIN — GABAPENTIN 200 MG: 100 CAPSULE ORAL at 09:39

## 2025-03-10 RX ADMIN — ALPRAZOLAM 1 MG: 0.5 TABLET ORAL at 20:23

## 2025-03-10 RX ADMIN — ENOXAPARIN SODIUM 30 MG: 100 INJECTION SUBCUTANEOUS at 09:40

## 2025-03-10 RX ADMIN — MICONAZOLE NITRATE: 2 POWDER TOPICAL at 20:26

## 2025-03-10 ASSESSMENT — PAIN DESCRIPTION - FREQUENCY: FREQUENCY: CONTINUOUS

## 2025-03-10 ASSESSMENT — PAIN DESCRIPTION - ONSET: ONSET: ON-GOING

## 2025-03-10 ASSESSMENT — PAIN DESCRIPTION - LOCATION: LOCATION: ABDOMEN

## 2025-03-10 ASSESSMENT — PAIN SCALES - GENERAL: PAINLEVEL_OUTOF10: 10

## 2025-03-10 ASSESSMENT — PAIN DESCRIPTION - DESCRIPTORS: DESCRIPTORS: ACHING

## 2025-03-10 ASSESSMENT — PAIN DESCRIPTION - PAIN TYPE: TYPE: CHRONIC PAIN

## 2025-03-10 ASSESSMENT — PAIN DESCRIPTION - ORIENTATION: ORIENTATION: LOWER

## 2025-03-10 ASSESSMENT — PAIN - FUNCTIONAL ASSESSMENT: PAIN_FUNCTIONAL_ASSESSMENT: ACTIVITIES ARE NOT PREVENTED

## 2025-03-10 NOTE — FLOWSHEET NOTE
Physical Therapy Daily Treatment Note   Date: 3/10/2025 Room: [address] [place] [address]    Name: Darrell Sutton : 1950   MRN: [id number] Admission Date:3/7/2025      Restrictions/Precautions: Restrictions/Precautions  Restrictions/Precautions: Fall Risk, General Precautions     Initial Pain level: not rated/10    Subjective/Observation: Pt in bed upon arrival. Agreeable to therapy session.     Communication with other providers: Cotx with OT for safety and pt tolerance      Therapeutic Activities/Neuro Re-Education/Gait Training   Date: 3/10/25   Date:  Date:  Date:    Bed   Mobility Mod A supine>sit with HOB elevated and cues for sequencing. Pt able to initiate LE movement today  Max A x2 sit>supine for LEs and trunk  DEP scooting toward HOB        STS   Transfer 5x at EOB with RW. Initially pt requires Min A d/t forward lean but progresses to CGA with upright posture      Stand   Pivot   Transfer   x        Commode Transfer   x        Sitting  Balance Good at EOB x15 minutes        Standing Balance   Pt stands 2x2' with RW for support. Initially Min A for balance, improved to CGA with cues for upright posture.         Ambulation Pt completes side stepping to [facility] with Min A and walker management. Decreased foot clearance and small steps noted.         Stairs   x          Therapeutic Exercises   Date: 3/10/25   Date:  Date:  Date:   Seated  LAQ  Marches  Ankle pumps 1x10 BLEs partial ROM      Repeated STS 1x3 at EOB with RW          Education provided:       Treatment/Activity Tolerance:   [x] Patient limited by fatigue   [] Patient limited by pain   [] Patient limited by other medical complications   [x] Patient tolerated therapy well  [] Other:     Safety Precautions:     [x] Left in bed    [] Left in chair   [x] Call light within reach    [x] Bed alarm on    [] Personal alarm on    [] Other staff present:  [] Family/Caregiver present:      Post Tx Pain Rating:  back and neck pain during bed mobility, improved once at  rest     Social/Functional History:  Lives With: Spouse  Type of Home: House  Home Layout: One level  Home Access: Ramped entrance          PT Equipment:  Home Equipment: Walker - Rolling, Lift chair, Wheelchair - Manual (BSC)    Evaluation Assessment  3/7/25  Patient is a 75 y.o. female who presents to [facility] with acute respiratory failure with hypoxia and multiple falls. Patient would benefit from continued skilled physical therapy services to address decreased strength, endurance, impaired balance, and decline in functional mobility.     Goals:         Time frame: 1 week or until discharge   - Patient will complete all bed mobility with Mod A  - Patient will complete STS transfers from EOB, commode, and bedside chair with CGA  - Patient will complete SPT between surfaces with RW and Min A  - Patient will ambulate 5ft with RW and Min A  - Patient will perform dynamic sitting balance activities for 10 minutes without LOB.         Time In 1450   Time Out 1514   Total Minutes  24   Billed Units 1TA, 1TE       Signed: Alivia Osman, PT, DPT [id number] 3/10/2025, 4:07 PM

## 2025-03-10 NOTE — PROGRESS NOTES
V2.0  [facility] [place]      Name:  Darrell Sutton /Age/Sex: 1950  (75 y.o. female)   MRN & CSN:  [id number] & [id number] Encounter Date/Time: 3/10/2025 2:38 PM EDT    Location:   PCP: Max Koo MD       Hospital Day: 4    Assessment and Plan:   Darrell Sutton is a 75 y.o. female who presents with Cellulitis    Ambulatory dysfunction  Generalized weakness  Recurrent falls  -Likely due to physical deconditioning, bilateral lower extremity weakness, polypharmacy. Does have a history of spinal stenosis which may be contributing  -Trauma imaging negative for acute fracture  -PT/OT recommended SNF.  CM following.  -Dispo: Accepted to [facility]. Can discharge once medically ready.    Acute respiratory failure with hypoxia, improved  -SpO2 73% on room air in ED.  Required up to 6L NC, weaned to 4 L.  -Concern for volume overload  -CXR with no acute process  - -May be falsely low due to obesity  -No recent echo on file  -Longstanding history of smoking/vaping.  Would benefit from PFTs as outpatient.  Likely has underlying COPD.  -S/p IV diuresis, transitioned to p.o.  -likely discharge tomorrow once echo completed and if oxygen requirements continue to decrease    Hypokalemia   - replacement ordered   - recheck in AM    Acute metabolic encephalopathy, improved  -Patient is having intermittent periods of confusion and hallucinations while hospitalized  -Likely multifactorial in setting of hypoxia, hospital-acquired delirium, polypharmacy.    -Delirium precautions, scheduled melatonin  -Patient was alert and oriented x 4 this morning.    Chronic bilateral lower extremity edema  -May be secondary to lymphedema versus hypervolemia  -Echo pending  -Received IV diuresis with some improvement    SIRS  -Criteria met on admission with leukocytosis and tachycardia  -Afebrile, lactic acid normal  -Patient was given vancomycin and cefepime in ED for suspected cellulitis of her     Potassium 3.2 (L) 3.5 - 5.1 mmol/L    Chloride 99 99 - 110 mmol/L    CO2 33 (H) 21 - 32 mmol/L    Anion Gap 8 4 - 16 mmol/L    Glucose 123 (H) 74 - 99 mg/dL    BUN 9 6 - 23 mg/dL    Creatinine 0.4 (L) 0.6 - 1.1 mg/dL    Est, Glom Filt Rate >90 >60 mL/min/1.73m2    Calcium 7.6 (L) 8.3 - 10.6 mg/dL        Imaging/Diagnostics Last 24 Hours   No results found.    Electronically signed by Kimber Castano PA-C on 3/10/2025 at 1:46 PM

## 2025-03-10 NOTE — CARE COORDINATION
PAULO completed online.     12:04 PM  LISA spoke with Gisela at [facility] to notify her that it is anticipated that the patient will be medically stable for discharge tomorrow (3/1/25).  CM will follow.

## 2025-03-10 NOTE — PROGRESS NOTES
This RN has reviewed and agrees with Joseline Manjarrez LPN's data collection and has collaborated with this LPN regarding the patient's care plan.

## 2025-03-10 NOTE — DISCHARGE INSTR - COC
Continuity of Care Form    Patient Name: Darrell Sutton   :  1950  MRN:  [id number]    Admit date:  3/7/2025  Discharge date:  3/11/2025    Code Status Order: DNR-CCA   Advance Directives:     Admitting Physician:  Lolis Shaikh MD  PCP: Max Koo MD    Discharging Nurse: Ian HENNESSY  Discharging Hospital Unit/Room#: 010/010-01  Discharging Unit Phone Number: [phone]    Emergency Contact:   Extended Emergency Contact Information  Primary Emergency Contact: Alon Sutton  Address: [address] [place] [address]  Home Phone: [phone]  Mobile Phone: [phone]  Relation: Spouse  Secondary Emergency Contact: Scar Sutton   [facility]  Home Phone: [phone]  Mobile Phone: [phone]  Relation: Child    Past Surgical History:  Past Surgical History:   Procedure Laterality Date    BREAST LUMPECTOMY      BREAST SURGERY  2009    left lumpectomy    CHOLECYSTECTOMY, LAPAROSCOPIC  2014    Robotic assisted laproscopic cholesystectomy    EYE SURGERY Right     Cataract Extraction    TONSILLECTOMY      TUBAL LIGATION         Immunization History:   Immunization History   Administered Date(s) Administered    COVID-19, MODERNA BLUE border, Primary or Immunocompromised, (age 12y+), IM, 100 mcg/0.5mL 2021, 2021    COVID-19, MODERNA Booster BLUE border, (age 18y+), IM, 50mcg/0.25mL 10/23/2021    COVID-19, PFIZER Bivalent, DO NOT Dilute, (age 12y+), IM, 30 mcg/0.3 mL 10/10/2022    COVID-19, PFIZER, (age 12y+), IM, 30mcg/0.3mL 10/09/2023, 10/17/2024       Active Problems:  Patient Active Problem List   Diagnosis Code    Cataract H26.9    Cataract H26.9    Cellulitis L03.90       Isolation/Infection:   Isolation            No Isolation          Patient Infection Status    None to display              Nurse Assessment:  Last Vital Signs: /66   Pulse 94   Temp 98.3 °F (36.8 °C) (Oral)   Resp 16   Ht 1.626 m (5' 4\")

## 2025-03-10 NOTE — CONSULTS
Comprehensive Nutrition Assessment    Type and Reason for Visit:  Initial, Wound (Gato Scale)    Nutrition Recommendations/Plan:   Continue to provide Regular diet  Offer snacks between meals  Please document accurate po intakes and weights  Will monitor weights, labs, po intakes and POC     Malnutrition Assessment:  Malnutrition Status:  No malnutrition (03/10/25 2327)    Context:  Acute Illness       Nutrition Assessment:    Pt admitted from home with  for cellulitis of abdominal wall, acute respiratory failure with hypoxia, recurrent falls, hypervolemia. Hx includes HTN, OA, IBS. Diet order regular with intakes noted to be improving since rfrcplcez-%, 51-75%, %, 0%, 26-50%. Pt reports she does feel her appetite is starting to improve-she reports prior to this admission pt reports she had the flu with horrible n/v/d but reports this has improved. Pt also reports she receives Meals on Wheels at home-currently on hold for two weeks d/t illness and hospitalization. Pt denies wt changes at this time. Currently no s/s malnutrition, however, if po intakes do not remain consistently 50% or more at meals may be at risk for malnutrition. At this time will monitor and follow at low nutrition risk d/t improvement in po intake, however, will monitor for any further change in condition.    Nutrition Related Findings:    K+ 3.2 L, Gluc 123 H, Creat 0.4 L. Meds include Lasix, KCl. Wound Type: Moisture Associate Skin Damage       Current Nutrition Intake & Therapies:    Average Meal Intake: %, 51-75%  Average Supplements Intake: None Ordered  ADULT DIET; Regular    Anthropometric Measures:  Height: 162.6 cm (5' 4\")  Ideal Body Weight (IBW): 120 lbs (55 kg)    Admission Body Weight: 138.3 kg (305 lb)  Current Body Weight: 134.3 kg (296 lb), 246.7 % IBW. Weight Source: Bed scale  Current BMI (kg/m2): 50.8  Usual Body Weight: 129.3 kg (285 lb) (Per office visit: 11/26/24: 285#, 8/28/24: 284#)  % Weight  Change (Calculated): 3.9  Weight Adjustment For: No Adjustment   BMI Categories: Obese Class 3 (BMI 40.0 or greater)    Nutrition Diagnosis:   No nutrition diagnosis at this time related to   as evidenced by      Nutrition Interventions:   Food and/or Nutrient Delivery: Continue Current Diet, Snacks  Nutrition Education/Counseling: No recommendation at this time (Pt denies questions and reports \"I typically do not care for dietitians.\")  Coordination of Nutrition Care: Continue to monitor while inpatient  Plan of Care discussed with: pt    Goals:  Goals: PO intake 75% or greater  Type of Goal: New goal       Nutrition Monitoring and Evaluation:   Behavioral-Environmental Outcomes: None Identified  Food/Nutrient Intake Outcomes: Food and Nutrient Intake  Physical Signs/Symptoms Outcomes: Biochemical Data, Meal Time Behavior, Skin, Weight    Discharge Planning:    Continue current diet     Marie Ortiz RD  Contact: [phone]

## 2025-03-10 NOTE — FLOWSHEET NOTE
Occupational Therapy Treatment Note  Name: Darrell Sutton MRN: [id number] :   1950   Date:  3/10/2025   Admission Date: 3/7/2025 Room:  [address] [place] [address]     Primary Problem: Assessment  Assessment: Patient is a 75 y.o. female who presents to [facility] with acute respiratory failure with hypoxia and multiple falls at home and has not been ambulatory. Patient presents below baseline functional level and would benefit from continued skilled occupational therapy services to address ADLs, functional transfers, and activity tolerance for general strengthening.    Restrictions/Precautions:  Restrictions/Precautions  Restrictions/Precautions: Fall Risk, General Precautions     Communication with other providers:   Cleared for treatment by RN.  Co-treat PT for Pt trf safety and endurance    Subjective:  Patient states:  \"I am doing better than Friday, my legs are a lot less swollen\"  Pain:   Location, Type, Intensity (0/10 to 10/10):  8/10  in back    Objective:    Observation:  Pt awake supine in bed agreeable to therapy  Objective Measures:  Pt seen for functional mobility training and therapeutic exercise    Treatment, including education:  Transfers  Supine to sit :Maximum Assistance to turn trunk, Pt demo improved attempts to move legs over to EOB requiring more assistance with LLE.  Pt able to grasp therapist's hand and try to pull trunk upright then with mod-max A to sit upright at EOB.    Sit to supine :Maximum Assistance to lift BLE up over EOB and to move hips to be centered in bed  Scooting :Total Dependant to scoot up in bed  Rolling :Maximum Assistance to roll to R side to grasp bedrail during supine to sit  Sit to stand :Contact Guard Assistance and Minimal Assistance from EOB up to RW with support from therapy to hold walker  Stand to sit :Contact Guard Assistance      Self Care Training:   Activities performed today included the following:    LB Dress  Maximum Assistance to jorge socks with Pt lifting feet off  ADLs with mod A using AE PRN  Short Term Goal 5: Pt will complete 15+ min of BUE therex to increase strength/endurance for ADLs/functional mobility

## 2025-03-11 VITALS
HEART RATE: 94 BPM | RESPIRATION RATE: 18 BRPM | SYSTOLIC BLOOD PRESSURE: 134 MMHG | DIASTOLIC BLOOD PRESSURE: 67 MMHG | BODY MASS INDEX: 50.02 KG/M2 | WEIGHT: 293 LBS | TEMPERATURE: 97.9 F | OXYGEN SATURATION: 94 % | HEIGHT: 64 IN

## 2025-03-11 LAB
ALBUMIN SERPL-MCNC: 2.7 G/DL (ref 3.4–5)
ALBUMIN/GLOB SERPL: 1 {RATIO}
ALP SERPL-CCNC: 91 U/L (ref 40–129)
ALT SERPL-CCNC: 20 U/L (ref 10–40)
ANION GAP SERPL CALCULATED.3IONS-SCNC: 12 MMOL/L (ref 9–17)
AST SERPL-CCNC: 33 U/L (ref 15–37)
BASOPHILS # BLD: 0.04 K/UL
BASOPHILS NFR BLD: 0 % (ref 0–1)
BILIRUB SERPL-MCNC: 0.2 MG/DL (ref 0–1)
BUN SERPL-MCNC: 11 MG/DL (ref 7–20)
CALCIUM SERPL-MCNC: 7.4 MG/DL (ref 8.3–10.6)
CHLORIDE SERPL-SCNC: 99 MMOL/L (ref 99–110)
CO2 SERPL-SCNC: 28 MMOL/L (ref 21–32)
CREAT SERPL-MCNC: <0.5 MG/DL (ref 0.6–1.2)
EOSINOPHIL # BLD: 0.14 K/UL
EOSINOPHILS RELATIVE PERCENT: 1 % (ref 0–3)
ERYTHROCYTE [DISTWIDTH] IN BLOOD BY AUTOMATED COUNT: 15.3 % (ref 11.7–14.9)
GFR, ESTIMATED: ABNORMAL ML/MIN/1.73M2
GLUCOSE SERPL-MCNC: 131 MG/DL (ref 74–99)
HCT VFR BLD AUTO: 38.1 % (ref 37–47)
HGB BLD-MCNC: 11.7 G/DL (ref 12.5–16)
IMM GRANULOCYTES # BLD AUTO: 0.11 K/UL
IMM GRANULOCYTES NFR BLD: 1 %
LYMPHOCYTES NFR BLD: 0.94 K/UL
LYMPHOCYTES RELATIVE PERCENT: 8 % (ref 24–44)
MCH RBC QN AUTO: 27.9 PG (ref 27–31)
MCHC RBC AUTO-ENTMCNC: 30.7 G/DL (ref 32–36)
MCV RBC AUTO: 90.9 FL (ref 78–100)
MONOCYTES NFR BLD: 0.77 K/UL
MONOCYTES NFR BLD: 6 % (ref 0–4)
NEUTROPHILS NFR BLD: 84 % (ref 36–66)
NEUTS SEG NFR BLD: 10.54 K/UL
PLATELET # BLD AUTO: 307 K/UL (ref 140–440)
PMV BLD AUTO: 10.3 FL (ref 7.5–11.1)
POTASSIUM SERPL-SCNC: 3.7 MMOL/L (ref 3.5–5.1)
PROT SERPL-MCNC: 5.5 G/DL (ref 6.4–8.2)
RBC # BLD AUTO: 4.19 M/UL (ref 4.2–5.4)
SODIUM SERPL-SCNC: 139 MMOL/L (ref 136–145)
WBC OTHER # BLD: 12.5 K/UL (ref 4–10.5)

## 2025-03-11 PROCEDURE — 2500000003 HC RX 250 WO HCPCS: Performed by: STUDENT IN AN ORGANIZED HEALTH CARE EDUCATION/TRAINING PROGRAM

## 2025-03-11 PROCEDURE — 94761 N-INVAS EAR/PLS OXIMETRY MLT: CPT

## 2025-03-11 PROCEDURE — 6360000002 HC RX W HCPCS: Performed by: STUDENT IN AN ORGANIZED HEALTH CARE EDUCATION/TRAINING PROGRAM

## 2025-03-11 PROCEDURE — 80053 COMPREHEN METABOLIC PANEL: CPT

## 2025-03-11 PROCEDURE — 2700000000 HC OXYGEN THERAPY PER DAY

## 2025-03-11 PROCEDURE — 36415 COLL VENOUS BLD VENIPUNCTURE: CPT

## 2025-03-11 PROCEDURE — 6370000000 HC RX 637 (ALT 250 FOR IP): Performed by: NURSE PRACTITIONER

## 2025-03-11 PROCEDURE — 6370000000 HC RX 637 (ALT 250 FOR IP): Performed by: STUDENT IN AN ORGANIZED HEALTH CARE EDUCATION/TRAINING PROGRAM

## 2025-03-11 PROCEDURE — 6370000000 HC RX 637 (ALT 250 FOR IP): Performed by: PHYSICIAN ASSISTANT

## 2025-03-11 PROCEDURE — 85025 COMPLETE CBC W/AUTO DIFF WBC: CPT

## 2025-03-11 RX ORDER — GABAPENTIN 100 MG/1
200 CAPSULE ORAL 3 TIMES DAILY
Qty: 18 CAPSULE | Refills: 0 | Status: SHIPPED | OUTPATIENT
Start: 2025-03-11 | End: 2025-03-11

## 2025-03-11 RX ORDER — ALPRAZOLAM 1 MG/1
1 TABLET ORAL 3 TIMES DAILY
Qty: 9 TABLET | Refills: 0 | Status: SHIPPED | OUTPATIENT
Start: 2025-03-11 | End: 2025-03-11

## 2025-03-11 RX ORDER — ALPRAZOLAM 1 MG/1
1 TABLET ORAL 3 TIMES DAILY
Qty: 30 TABLET | Refills: 0 | Status: SHIPPED | OUTPATIENT
Start: 2025-03-11 | End: 2025-03-21

## 2025-03-11 RX ORDER — GABAPENTIN 100 MG/1
200 CAPSULE ORAL 3 TIMES DAILY
Qty: 180 CAPSULE | Refills: 0
Start: 2025-03-11 | End: 2025-04-10

## 2025-03-11 RX ORDER — OXYCODONE AND ACETAMINOPHEN 5; 325 MG/1; MG/1
1 TABLET ORAL EVERY 8 HOURS PRN
Qty: 6 TABLET | Refills: 0 | Status: SHIPPED | OUTPATIENT
Start: 2025-03-11 | End: 2025-03-13

## 2025-03-11 RX ORDER — FUROSEMIDE 40 MG/1
40 TABLET ORAL DAILY
Qty: 30 TABLET | Refills: 0
Start: 2025-03-12

## 2025-03-11 RX ORDER — POTASSIUM CHLORIDE 1500 MG/1
40 TABLET, EXTENDED RELEASE ORAL DAILY
Qty: 60 TABLET | Refills: 0
Start: 2025-03-11 | End: 2025-04-10

## 2025-03-11 RX ORDER — POTASSIUM CHLORIDE 1500 MG/1
40 TABLET, EXTENDED RELEASE ORAL ONCE
Status: COMPLETED | OUTPATIENT
Start: 2025-03-11 | End: 2025-03-11

## 2025-03-11 RX ADMIN — ALPRAZOLAM 1 MG: 0.5 TABLET ORAL at 08:53

## 2025-03-11 RX ADMIN — FUROSEMIDE 40 MG: 40 TABLET ORAL at 08:53

## 2025-03-11 RX ADMIN — CITALOPRAM HYDROBROMIDE 40 MG: 20 TABLET ORAL at 08:53

## 2025-03-11 RX ADMIN — OXYCODONE AND ACETAMINOPHEN 1 TABLET: 5; 325 TABLET ORAL at 12:41

## 2025-03-11 RX ADMIN — PANTOPRAZOLE SODIUM 40 MG: 40 TABLET, DELAYED RELEASE ORAL at 08:53

## 2025-03-11 RX ADMIN — ENOXAPARIN SODIUM 30 MG: 100 INJECTION SUBCUTANEOUS at 08:53

## 2025-03-11 RX ADMIN — MICONAZOLE NITRATE: 2 POWDER TOPICAL at 08:53

## 2025-03-11 RX ADMIN — SODIUM CHLORIDE, PRESERVATIVE FREE 10 ML: 5 INJECTION INTRAVENOUS at 08:59

## 2025-03-11 RX ADMIN — POTASSIUM CHLORIDE 40 MEQ: 1500 TABLET, EXTENDED RELEASE ORAL at 12:38

## 2025-03-11 RX ADMIN — GABAPENTIN 200 MG: 100 CAPSULE ORAL at 08:53

## 2025-03-11 RX ADMIN — AMLODIPINE BESYLATE 10 MG: 10 TABLET ORAL at 08:53

## 2025-03-11 ASSESSMENT — PAIN - FUNCTIONAL ASSESSMENT: PAIN_FUNCTIONAL_ASSESSMENT: PREVENTS OR INTERFERES SOME ACTIVE ACTIVITIES AND ADLS

## 2025-03-11 ASSESSMENT — PAIN DESCRIPTION - PAIN TYPE: TYPE: CHRONIC PAIN

## 2025-03-11 ASSESSMENT — PAIN DESCRIPTION - FREQUENCY: FREQUENCY: CONTINUOUS

## 2025-03-11 ASSESSMENT — PAIN DESCRIPTION - ORIENTATION: ORIENTATION: LOWER

## 2025-03-11 ASSESSMENT — PAIN SCALES - GENERAL
PAINLEVEL_OUTOF10: 0
PAINLEVEL_OUTOF10: 9

## 2025-03-11 ASSESSMENT — PAIN DESCRIPTION - LOCATION: LOCATION: BACK

## 2025-03-11 ASSESSMENT — PAIN DESCRIPTION - ONSET: ONSET: ON-GOING

## 2025-03-11 ASSESSMENT — PAIN DESCRIPTION - DESCRIPTORS: DESCRIPTORS: ACHING

## 2025-03-11 NOTE — DISCHARGE SUMMARY
V2.0  Discharge Summary    Name:  Darrell Sutton /Age/Sex: 1950 (75 y.o. female)   Admit Date: 3/7/2025  Discharge Date: 3/11/25    MRN & CSN:  [id number] & [id number] Encounter Date and Time 3/11/25 11:06 AM EDT    Attending:  Dave Mcnulty MD Discharging Provider: ERNST NIELSEN - NP       Hospital Course:     Brief HPI:   Darrell Sutton is a 75 y.o. female with pmh of hypertension, osteoarthritis, IBS who presents with frequent falls ongoing for last few weeks.  Patient states that she has been progressively getting weaker in the legs and was having frequent falls sometimes her legs would not move when she would fall down today she called the squad to 3 times at her home to get her up after a fall so the third time the squad decided to bring her to the emergency department.  Patient denies losing any consciousness she states that she has always had lower back pain no new medication to chills before the symptoms started.  Her and her  had viral illness couple weeks ago but patient states that the symptoms have been going on even prior to that.  Complains of occasional chills bowel movements are regular denies any fever denies any nausea vomiting shortness of breath chest pain lightheadedness or dizziness.   Denied any current tobacco or alcohol use.         Patient seen and examined today at bedside resting staff and case management, she has no complaints she is awake alert and oriented x 4.  She will be discharging today to [facility] for further rehab.  She is in stable condition.          Brief Problem Based Course:   Ambulatory dysfunction generalized weakness recurrent falls could be secondary to the Dilantin patient was taking.  She does have a history of spinal stenosis which may be contributing.  All imaging negative for any acute fractures.  She was seen by physical and Occupational Therapy who recommended SNF.  She will be discharging to [facility] nursing home today  Acute  paper prescription for each of these medications  ALPRAZolam 1 MG tablet  oxyCODONE-acetaminophen 5-325 MG per tablet       Information about where to get these medications is not yet available    Ask your nurse or doctor about these medications  furosemide 40 MG tablet  gabapentin 100 MG capsule  miconazole 2 % powder  potassium chloride 20 MEQ extended release tablet        Objective Findings at Discharge:   /67   Pulse 94   Temp 97.9 °F (36.6 °C) (Oral)   Resp 18   Ht 1.626 m (5' 4\")   Wt 135 kg (297 lb 9.9 oz)   SpO2 94%   BMI 51.09 kg/m²       Physical Exam:     General: NAD  Eyes: EOMI  ENT: neck supple  Cardiovascular: Regular rate and rhythm  Respiratory: Clear to auscultation bilateral throughout all lung fields on room air  Gastrointestinal: Obese soft, non tender nondistended bowel sounds present in all 4 quadrants.  Genitouinary: no suprapubic tenderness  Musculoskeletal: Chronic mild bilateral lower extremity nonpitting edema.  Skin: warm, dry  Neuro: Alert and oriented  Psych: Mood appropriate.         Labs and Imaging   Echo (TTE) complete (PRN contrast/bubble/strain/3D)  Result Date: 3/10/2025    Left Ventricle: Normal left ventricular systolic function with a visually estimated EF of 55 - 60%. Left ventricle size is normal. Normal wall thickness. Normal wall motion.   No significant valvular disease noted.   Pericardium: There is evidence of epicardial fat. No pericardial effusion.     CT CERVICAL SPINE WO CONTRAST  Result Date: 3/7/2025  PROCEDURE: CT CERVICAL SPINE WO CONTRAST DATE OF EXAM:  3/7/2025 1:29 DEMOGRAPHICS: 75 years old Female INDICATION: trauma COMPARISON: No existing relevant imaging study corresponding to the same anatomical region is available. TECHNIQUE: Contiguous axial slices of the cervical spine were submitted without IV administration of contrast. Additional coronal and sagittal reformatted images were submitted.    DOSE OPTIMIZATION: CT radiation dose

## 2025-03-11 NOTE — CARE COORDINATION
NURSING: The patient will be discharged to [facility] today.  Please complete the RAYMUNDO form, call report to [phone], and notify the patient's family.  Medical transportation has been arranged at 1:30 PM through [place].

## 2025-03-11 NOTE — DISCHARGE INSTRUCTIONS
Skin care:  Apply Micotin powder to breasts/abdomen/groins/posterior knee folds BID and keep folds  with Drigo change every 3 days and prn.   Pt is a mild risk for skin breakdown AEB Gato. Recommend repositioning with wedges and floating heels with pillows. Follow Gato orders.

## 2025-03-11 NOTE — PLAN OF CARE
Problem: Safety - Adult  Goal: Free from fall injury  3/11/2025 1011 by Ian Falcon, RN  Outcome: Progressing  3/11/2025 0018 by Nat Crum RN  Outcome: Progressing     Problem: Pain  Goal: Verbalizes/displays adequate comfort level or baseline comfort level  3/11/2025 1011 by Ian Falcon, RN  Outcome: Progressing  3/11/2025 0018 by Nat Crum RN  Outcome: Progressing     Problem: Skin/Tissue Integrity  Goal: Skin integrity remains intact  Description: 1.  Monitor for areas of redness and/or skin breakdown  2.  Assess vascular access sites hourly  3.  Every 4-6 hours minimum:  Change oxygen saturation probe site  4.  Every 4-6 hours:  If on nasal continuous positive airway pressure, respiratory therapy assess nares and determine need for appliance change or resting period  3/11/2025 1011 by Ian Falcon, RN  Outcome: Progressing  3/11/2025 0018 by Nat Crum RN  Outcome: Progressing     Problem: Discharge Planning  Goal: Discharge to home or other facility with appropriate resources  3/11/2025 1011 by Ian Falcon, RN  Outcome: Progressing  3/11/2025 0018 by Nat Crum RN  Outcome: Progressing

## 2025-03-11 NOTE — DISCHARGE INSTR - DIET

## 2025-03-11 NOTE — CARE COORDINATION
CM arranged medical transport to [facility] at 1:30 PM through [place].  LISA notified Ian HENNESSY.  CM will follow.

## 2025-03-12 LAB
MICROORGANISM SPEC CULT: NORMAL
SERVICE CMNT-IMP: NORMAL
SPECIMEN DESCRIPTION: NORMAL

## 2025-03-13 ENCOUNTER — PARAMEDICINE (OUTPATIENT)
Dept: OTHER | Age: 75
End: 2025-03-13

## 2025-03-13 NOTE — PROGRESS NOTES
Patient was discharged to facility for rehab. Advised patient to call when she is home and CP will make another home visit. Will continue to follow.

## 2025-03-19 ENCOUNTER — PARAMEDICINE (OUTPATIENT)
Dept: OTHER | Age: 75
End: 2025-03-19

## 2025-03-19 NOTE — PROGRESS NOTES
Patient went to [facility] for skilled rehab. Patient intends to return home. Will leave the file open for a few weeks. Patient states she will let CP know when she is home. Plan is to make another home visit to make sure all needs are met.

## 2025-04-02 ENCOUNTER — RESULTS FOLLOW-UP (OUTPATIENT)
Dept: EMERGENCY DEPT | Age: 75
End: 2025-04-02

## 2025-04-15 ENCOUNTER — TELEPHONE (OUTPATIENT)
Dept: OTHER | Age: 75
End: 2025-04-15

## 2025-04-15 NOTE — TELEPHONE ENCOUNTER
Reached out to patient to see if she was home from rehab or had any needs. No answer. Left  for return call.

## 2025-05-08 ENCOUNTER — TELEPHONE (OUTPATIENT)
Dept: OTHER | Age: 75
End: 2025-05-08

## 2025-05-08 NOTE — TELEPHONE ENCOUNTER
Spoke with patient. States she is doing fine. She is able to ambulate around the house. At this time declines any needs. Will leave file open for a short time and check in one more time.

## 2025-06-18 ENCOUNTER — PARAMEDICINE (OUTPATIENT)
Dept: OTHER | Age: 75
End: 2025-06-18

## 2025-06-18 NOTE — PROGRESS NOTES
Have not heard from the patient in a while. At last check patient was home, ambulating, declined needs. Will remove the patient from the program at this time. Should another referral be made another file will be opened.